# Patient Record
Sex: FEMALE | Race: WHITE | Employment: OTHER | URBAN - METROPOLITAN AREA
[De-identification: names, ages, dates, MRNs, and addresses within clinical notes are randomized per-mention and may not be internally consistent; named-entity substitution may affect disease eponyms.]

---

## 2017-04-11 RX ORDER — URSODIOL 250 MG/1
750 TABLET, FILM COATED ORAL DAILY
Qty: 270 TAB | Refills: 3 | Status: SHIPPED | OUTPATIENT
Start: 2017-04-11 | End: 2017-09-22 | Stop reason: DRUGHIGH

## 2017-09-21 ENCOUNTER — OFFICE VISIT (OUTPATIENT)
Dept: HEMATOLOGY | Age: 82
End: 2017-09-21

## 2017-09-21 ENCOUNTER — HOSPITAL ENCOUNTER (OUTPATIENT)
Dept: LAB | Age: 82
Discharge: HOME | End: 2017-09-21
Payer: MEDICARE

## 2017-09-21 VITALS
SYSTOLIC BLOOD PRESSURE: 160 MMHG | HEART RATE: 70 BPM | WEIGHT: 93.2 LBS | OXYGEN SATURATION: 100 % | HEIGHT: 63 IN | DIASTOLIC BLOOD PRESSURE: 57 MMHG | RESPIRATION RATE: 16 BRPM | TEMPERATURE: 97.7 F | BODY MASS INDEX: 16.51 KG/M2

## 2017-09-21 DIAGNOSIS — K74.3 PRIMARY BILIARY CIRRHOSIS (HCC): ICD-10-CM

## 2017-09-21 DIAGNOSIS — K74.3 PRIMARY BILIARY CIRRHOSIS (HCC): Primary | ICD-10-CM

## 2017-09-21 LAB
ALBUMIN SERPL-MCNC: 3.4 G/DL (ref 3.4–5)
ALBUMIN/GLOB SERPL: 1.1 {RATIO} (ref 0.8–1.7)
ALP SERPL-CCNC: 100 U/L (ref 45–117)
ALT SERPL-CCNC: 14 U/L (ref 13–56)
ANION GAP SERPL CALC-SCNC: 9 MMOL/L (ref 3–18)
AST SERPL-CCNC: 13 U/L (ref 15–37)
BASOPHILS # BLD: 0 K/UL (ref 0–0.06)
BASOPHILS NFR BLD: 0 % (ref 0–2)
BILIRUB DIRECT SERPL-MCNC: 0.1 MG/DL (ref 0–0.2)
BILIRUB SERPL-MCNC: 0.2 MG/DL (ref 0.2–1)
BUN SERPL-MCNC: 22 MG/DL (ref 7–18)
BUN/CREAT SERPL: 21 (ref 12–20)
CALCIUM SERPL-MCNC: 9.2 MG/DL (ref 8.5–10.1)
CHLORIDE SERPL-SCNC: 103 MMOL/L (ref 100–108)
CO2 SERPL-SCNC: 27 MMOL/L (ref 21–32)
CREAT SERPL-MCNC: 1.03 MG/DL (ref 0.6–1.3)
DIFFERENTIAL METHOD BLD: ABNORMAL
EOSINOPHIL # BLD: 0.2 K/UL (ref 0–0.4)
EOSINOPHIL NFR BLD: 2 % (ref 0–5)
ERYTHROCYTE [DISTWIDTH] IN BLOOD BY AUTOMATED COUNT: 16.2 % (ref 11.6–14.5)
GLOBULIN SER CALC-MCNC: 3.1 G/DL (ref 2–4)
GLUCOSE SERPL-MCNC: 156 MG/DL (ref 74–99)
HCT VFR BLD AUTO: 36 % (ref 35–45)
HGB BLD-MCNC: 11.3 G/DL (ref 12–16)
LYMPHOCYTES # BLD: 1.2 K/UL (ref 0.9–3.6)
LYMPHOCYTES NFR BLD: 12 % (ref 21–52)
MCH RBC QN AUTO: 29.4 PG (ref 24–34)
MCHC RBC AUTO-ENTMCNC: 31.4 G/DL (ref 31–37)
MCV RBC AUTO: 93.5 FL (ref 74–97)
MONOCYTES # BLD: 1 K/UL (ref 0.05–1.2)
MONOCYTES NFR BLD: 10 % (ref 3–10)
NEUTS SEG # BLD: 8.2 K/UL (ref 1.8–8)
NEUTS SEG NFR BLD: 76 % (ref 40–73)
PLATELET # BLD AUTO: 339 K/UL (ref 135–420)
PMV BLD AUTO: 10.6 FL (ref 9.2–11.8)
POTASSIUM SERPL-SCNC: 4.2 MMOL/L (ref 3.5–5.5)
PROT SERPL-MCNC: 6.5 G/DL (ref 6.4–8.2)
RBC # BLD AUTO: 3.85 M/UL (ref 4.2–5.3)
SODIUM SERPL-SCNC: 139 MMOL/L (ref 136–145)
WBC # BLD AUTO: 10.6 K/UL (ref 4.6–13.2)

## 2017-09-21 PROCEDURE — 80048 BASIC METABOLIC PNL TOTAL CA: CPT | Performed by: NURSE PRACTITIONER

## 2017-09-21 PROCEDURE — 80076 HEPATIC FUNCTION PANEL: CPT | Performed by: NURSE PRACTITIONER

## 2017-09-21 PROCEDURE — 85025 COMPLETE CBC W/AUTO DIFF WBC: CPT | Performed by: NURSE PRACTITIONER

## 2017-09-21 PROCEDURE — 36415 COLL VENOUS BLD VENIPUNCTURE: CPT | Performed by: NURSE PRACTITIONER

## 2017-09-21 NOTE — MR AVS SNAPSHOT
Visit Information Date & Time Provider Department Dept. Phone Encounter #  
 9/21/2017  9:30 AM SARAH Maldonado [address] of  [address] [id number] Follow-up Instructions Return in about 6 months (around 3/21/2018). Upcoming Health Maintenance Date Due DTaP/Tdap/Td series (1 - Tdap) 1/28/1955 ZOSTER VACCINE AGE 60> 11/28/1993 GLAUCOMA SCREENING Q2Y 1/28/1999 Pneumococcal 65+ Low/Medium Risk (1 of 2 - PCV13) 1/28/1999 MEDICARE YEARLY EXAM 1/28/1999 INFLUENZA AGE 9 TO ADULT 8/1/2017 Allergies as of 9/21/2017  Review Complete On: 9/21/2017 By: Kristina Hernandez Severity Noted Reaction Type Reactions Sulfa (Sulfonamide Antibiotics)  07/28/2014    Itching Current Immunizations  Never Reviewed No immunizations on file. Not reviewed this visit You Were Diagnosed With   
  
 Codes Comments Primary biliary cirrhosis (HCC)    -  Primary ICD-10-CM: C43.5 ICD-9-CM: 571.6 Vitals BP Pulse Temp Resp Height(growth percentile) 160/57 (BP 1 Location: Left arm, BP Patient Position: Sitting) 70 97.7 °F (36.5 °C) (Tympanic) 16 5' 3\" (1.6 m) Weight(growth percentile) SpO2 BMI OB Status Smoking Status 93 lb 3.2 oz (42.3 kg) 100% 16.51 kg/m2 Postmenopausal Never Smoker Vitals History BMI and BSA Data Body Mass Index Body Surface Area  
 16.51 kg/m 2 1.37 m 2 Preferred Pharmacy Pharmacy Name Phone [facility] [place] [address] [facility] [address] [phone] Your Updated Medication List  
  
   
This list is accurate as of: 9/21/17 10:14 AM.  Always use your most recent med list.  
  
  
  
  
 aspirin delayed-release 81 mg tablet Take  by mouth daily. LORazepam 1 mg tablet Commonly known as:  ATIVAN Take  by mouth every four (4) hours as needed for Anxiety. NIFEdipine ER 60 mg ER tablet Commonly known as:  PROCARDIA XL Take 60 mg by mouth daily. pilocarpine 5 mg tablet Commonly known as:  Clara Sharan Take 5 mg by mouth three (3) times daily. simvastatin 80 mg tablet Commonly known as:  ZOCOR Take 80 mg by mouth nightly. sotalol 120 mg tablet Commonly known as:  Pablo Dakota Take 120 mg by mouth two (2) times a day. synthroid 25 mcg tablet Generic drug:  levothyroxine Take  by mouth Daily (before breakfast). ursodiol 250 mg tablet Commonly known as:  ACTIGALL Take 3 Tabs by mouth daily. valsartan-hydroCHLOROthiazide 160-25 mg per tablet Commonly known as:  DIOVAN-HCT Take 1 Tab by mouth daily. Follow-up Instructions Return in about 6 months (around 3/21/2018). To-Do List   
 09/21/2017 Imaging:  US ABD LTD W ELASTOGRAPHY Introducing [facility] SERVICES! Dear Donna Ribeiro: Thank you for requesting a Sh[employer] account. Our records indicate that you already have an active Shout TV account. You can access your account anytime at [email or web address]. [employer] Did you know that you can access your hospital and ER discharge instructions at any time in Sh[employer] TV? You can also review all of your test results from your hospital stay or ER visit. Additional Information If you have questions, please visit the Frequently Asked Questions section of the Shout TV website at [email or web address] [employer]/. Remember, Shout TV is NOT to be used for urgent needs. For medical emergencies, dial 911. Now available from your iPhone and Android! Please provide this summary of care documentation to your next provider. Your primary care clinician is listed as Ruslan Puri. If you have any questions after today's visit, please call [phone].

## 2017-09-21 NOTE — PROGRESS NOTES
[address] Sharma MD, [address]       SARAH Mcdonald PA-C Mason Abrahams, [facility]   Lacinda Boast, NP Dickson Dan, NP        at [facility]     [address], [address] [facility], [address]     [phone]     FAX: [phone]    at [address], [address] [facility], [address]     [phone]     FAX: [phone]       Patient Care Team:  Anna Reyes MD as PCP - General (Family Practice)  Sherry Dawkins MD (Ophthalmology)  Yaakov Fofana MD (Endocrinology)        Problem List  Date Reviewed: 9/21/2017          Codes Class Noted    PBC (primary biliary cirrhosis) ICD-10-CM: K74.3  ICD-9-CM: 571.6  8/11/2014        Hypertension ICD-10-CM: I10  ICD-9-CM: 401.9  7/28/2014        Hypothyroidism ICD-10-CM: E03.9  ICD-9-CM: 244.9  7/28/2014        Osteoporosis ICD-10-CM: M81.0  ICD-9-CM: 733.00  7/28/2014        Atrial fibrillation ([facility] ICD-10-CM: I48.91  ICD-9-CM: 427.31  7/28/2014    Overview Signed 7/28/2014  4:04 PM by Jerald Grant MD     Medical conversion. Remains on anti-arrythmic             Vitamin D deficiency ICD-10-CM: E55.9  ICD-9-CM: 268.9  7/28/2014        Hypercholesterolemia ICD-10-CM: E78.00  ICD-9-CM: 272.0  7/28/2014        H/O partial thyroidectomy ICD-10-CM: E89.0  ICD-9-CM: V45.89  7/28/2014        Sicca syndrome ([facility] ICD-10-CM: M35.00  ICD-9-CM: 710.2  7/28/2014              Tarsha Trinidad returns to the [address] for management of Primary Biliary Cirrhosis. The active problem list, all pertinent past medical history, medications, radiologic findings and laboratory findings related to the liver disorder were reviewed with the patient. She was first noted to have an elevation in alkaline phosphate in 6/2014. Serologic evaluation was positive for AMA.       Ultrasound of the liver was performed in 7/2014. The results of the imaging demonstrated a normal appearing liver. A liver biopsy has not been performed. The patient had vascular surgery to restore circulation to her left lower extremity in June, 2017.  placed stent in her arteries. The most recent laboratory studies indicate that the liver transaminases are normal, ALP is normal, tests of hepatic synthetic and metabolic function are normal, the platelet count is normal.    Ms. Kyle Hernandez began treatment with Margarita Gordon about 3 years ago. She had a great response and her alkaline phosphatase has now normalized. She is tolerating this well without any noted side effects. The patient notes fatigue, dry eyes, dry mouth, thinning hair and itching (especailly on her scalp) that remains unchanged from her previous visit. The patient has limitations in functional activities secondary to other medical problems that are not related to the liver disease. The patient has not experienced problems concentrating, swelling of the abdomen, swelling of the lower extremities, hematemesis, hematochezia. ALLERGIES  Allergies   Allergen Reactions    Sulfa (Sulfonamide Antibiotics) Itching       MEDICATIONS  Current Outpatient Prescriptions   Medication Sig    ursodiol (ACTIGALL) 250 mg tablet Take 3 Tabs by mouth daily. (Patient taking differently: Take 500 mg by mouth daily.)    sotalol (BETAPACE) 120 mg tablet Take 120 mg by mouth two (2) times a day.  valsartan-hydrochlorothiazide (DIOVAN-HCT) 160-25 mg per tablet Take 1 Tab by mouth daily.  NIFEdipine ER (PROCARDIA XL) 60 mg ER tablet Take 60 mg by mouth daily.  levothyroxine (SYNTHROID) 25 mcg tablet Take  by mouth Daily (before breakfast).  simvastatin (ZOCOR) 80 mg tablet Take 80 mg by mouth nightly.  aspirin delayed-release 81 mg tablet Take  by mouth daily.  pilocarpine (SALAGEN) 5 mg tablet Take 5 mg by mouth three (3) times daily.     LORazepam (ATIVAN) 1 mg tablet Take  by mouth every four (4) hours as needed for Anxiety. No current facility-administered medications for this visit. SYSTEM REVIEW NOT RELATED TO LIVER DISEASE OR REVIEWED ABOVE:  Constitution systems: Negative for fever, chills, weight gain, weight loss. Eyes: Negative for visual changes. ENT: Negative for sore throat, painful swallowing. Respiratory: Negative for cough, hemoptysis, SOB. Cardiology: Negative for chest pain, palpitations. GI:  Negative for constipation or diarrhea. : Negative for urinary frequency, dysuria, hematuria, nocturia. Skin: Negative for rash. Hematology: Negative for easy bruising, blood clots. Musculo-skelatal: Negative for back pain, muscle pain, weakness. Neurologic: Negative for headaches, dizziness, vertigo, memory problems not related to HE. Psychology: Negative for anxiety, depression. FAMILY HISTORY:  The father  of CVA. The patient has no knowledge of the mother's medical condition. There is no family history of liver disease. SOCIAL HISTORY:  The patient is . The patient has 2 children, and 3 grandchildren and 6 great grand-children. The patient has never used tobacco products. The patient consumes an occasional glass of wine. The patient has never worked outside the home. PHYSICAL EXAMINATION:    Visit Vitals    /57 (BP 1 Location: Left arm, BP Patient Position: Sitting)    Pulse 70    Temp 97.7 °F (36.5 °C) (Tympanic)    Resp 16    Ht 5' 3\" (1.6 m)    Wt 93 lb 3.2 oz (42.3 kg)    SpO2 100%    BMI 16.51 kg/m2       General: No acute distress. Eyes: Sclera anicteric. ENT: No oral lesions. Thyroid normal.  Nodes: No adenopathy. Skin: No spider angiomata. No jaundice. No palmar erythema. Respiratory: Lungs clear to auscultation. Cardiovascular: Regular heart rate. No murmurs. No JVD. Abdomen: Soft non-tender. Liver size normal to percussion/palpation. Spleen not palpable.  No obvious ascites. Extremities: No edema. No muscle wasting. No gross arthritic changes. Neurologic: Alert and oriented. Cranial nerves grossly intact. No asterixsis. LABORATORY STUDIES:  [address] & Units 9/21/2017 12/28/2016   WBC 4.6 - 13.2 K/uL 10.6 9.6   ANC 1.8 - 8.0 K/UL 8.2 (H) 7.1   HGB 12.0 - 16.0 g/dL 11.3 (L) 12.6    - 420 K/uL 339 295   INR 0.8 - 1.2     AST 15 - 37 U/L 13 (L) 16   ALT 13 - 56 U/L 14 18   Alk Phos 45 - 117 U/L 100 116   Bili, Total 0.2 - 1.0 MG/DL 0.2 0.4   Bili, Direct 0.0 - 0.2 MG/DL 0.1 0.1   Albumin 3.4 - 5.0 g/dL 3.4 3.8   BUN 7.0 - 18 MG/DL 22 (H) 33 (H)   Creat 0.6 - 1.3 MG/DL 1.03 1.43 (H)   Na 136 - 145 mmol/L 139 142   K 3.5 - 5.5 mmol/L 4.2 5.0   Cl 100 - 108 mmol/L 103 105   CO2 21 - 32 mmol/L 27 26   Glucose 74 - 99 mg/dL 156 (H) 101 (H)     [facility]iver [facility] Latest Ref Rng & Units 6/28/2016   WBC 4.6 - 13.2 K/uL 7.8   ANC 1.8 - 8.0 K/UL 5.3   HGB 12.0 - 16.0 g/dL 13.4    - 420 K/uL 272   INR 0.8 - 1.2    AST 15 - 37 U/L 16   ALT 13 - 56 U/L 20   Alk Phos 45 - 117 U/L 96   Bili, Total 0.2 - 1.0 MG/DL 0.4   Bili, Direct 0.0 - 0.2 MG/DL 0.1   Albumin 3.4 - 5.0 g/dL 4.0   BUN 7.0 - 18 MG/DL 22 (H)   Creat 0.6 - 1.3 MG/DL 0.96   Na 136 - 145 mmol/L 138   K 3.5 - 5.5 mmol/L 4.1   Cl 100 - 108 mmol/L 101   CO2 21 - 32 mmol/L 30   Glucose 74 - 99 mg/dL 110 (H)     SEROLOGIES:  Serologies Latest Ref Rng 7/28/2014   Ferritin 15 - 150 ng/mL 374 (H)   Iron % Saturation 15 - 55 % 21   EDILBERTO, IFA  See patterns   EDILBERTO Pattern  1:80   C-ANCA Neg:<1:20 titer <1:20   P-ANCA Neg:<1:20 titer <1:20   ANCA Neg:<1:20 titer <1:20   ASMCA 0 - 19 Units 6   M2 Ab 0.0 - 20.0 Units 142.8 (H)     LIVER HISTOLOGY:  Not available or performed    ENDOSCOPIC PROCEDURES:  Not available or performed    RADIOLOGY:  6/2014. Ultrasound of liver. Normal appearing liver. No liver mass lesions.     OTHER TESTING:  Not available or performed    ASSESSMENT AND PLAN:  Primary biliary cirrhosis of unclear severity. The most recent laboratory studies indicate that the liver transaminases are normal, alkaline phosphatase is normal, tests of hepatic synthetic and metabolic function are normal, and the platelet count is normal.      She is being treated with ELIANE 600 mg QD. She is tolerating this well and her enzymes and AlkPhos have all normalized. The patient was directed to continue all current medications at the current dosages. There are no contraindications for the patient to take any medications that are necessary for treatment of other medical issues. This includes statins to treat hypercholesterolemia. The patient was counseled regarding alcohol consumption. Sicca syndrome is an extrahepatic manifestation of PBC and will not resolve. Dr. Ang Yang can continue to treat the dry eyes this symptomatically. She should keep up her fluid intake to deal with the dry mouth and see the dentist regularly to reduce risk of cavities which is increased by Sicca. Discussed extrahepatic manifestations of PBC such as osteoporosis, elevated cholesterol and slight increased risk of breast cancer. The risk of osteoporosis is increased in patients with PBC. DEXA bone density to assess for osteoporosis should be ordered by the patients primary care physician. The patient was advised to take calcium supplementation and Vitamin D. She states that she has DEXA scans every 2 years. She was advised to have her mammogram completed. She was advised to take supplemental vitamin A, D, E, and K. Patients with PBC are at increased risk for hypercholesterolemia. However, this is not associated with an increased risk of cardiac disease and MI because this is typically an elevation in HDL cholesterol. There is no contraindication for treatment with a statin if this is felt to be indicated based upon cardiac risk assessment.     All of the above issues were discussed with the patient. All questions were answered. The patient expressed a clear understanding of the above. 30 minutes total time spent with this patient with more than 50% of this time spent counseling and coordinating care as described above. [address] [address] in 6 months.       Hair Vides NP  L[facility] [place] of [address] [place] [address], [address] [place] [address] , [address] [place] [address] - [address]  [phone]

## 2017-09-22 RX ORDER — URSODIOL 300 MG/1
CAPSULE ORAL
Refills: 2 | COMMUNITY
Start: 2017-07-16 | End: 2019-09-25 | Stop reason: SDUPTHER

## 2017-12-30 ENCOUNTER — HOSPITAL ENCOUNTER (EMERGENCY)
Age: 82
Discharge: HOME | End: 2017-12-30
Attending: EMERGENCY MEDICINE | Admitting: EMERGENCY MEDICINE
Payer: MEDICARE

## 2017-12-30 VITALS
DIASTOLIC BLOOD PRESSURE: 80 MMHG | SYSTOLIC BLOOD PRESSURE: 172 MMHG | RESPIRATION RATE: 18 BRPM | TEMPERATURE: 98 F | HEART RATE: 71 BPM | BODY MASS INDEX: 19.14 KG/M2 | OXYGEN SATURATION: 100 % | HEIGHT: 62 IN | WEIGHT: 104 LBS

## 2017-12-30 DIAGNOSIS — I10 UNCONTROLLED HYPERTENSION: ICD-10-CM

## 2017-12-30 DIAGNOSIS — R04.0 EPISTAXIS: Primary | ICD-10-CM

## 2017-12-30 LAB
ANION GAP SERPL CALC-SCNC: 10 MMOL/L (ref 3–18)
BASOPHILS # BLD: 0 K/UL (ref 0–0.06)
BASOPHILS NFR BLD: 0 % (ref 0–2)
BUN SERPL-MCNC: 28 MG/DL (ref 7–18)
BUN/CREAT SERPL: 25 (ref 12–20)
CALCIUM SERPL-MCNC: 9.8 MG/DL (ref 8.5–10.1)
CHLORIDE SERPL-SCNC: 101 MMOL/L (ref 100–108)
CO2 SERPL-SCNC: 25 MMOL/L (ref 21–32)
CREAT SERPL-MCNC: 1.1 MG/DL (ref 0.6–1.3)
DIFFERENTIAL METHOD BLD: ABNORMAL
EOSINOPHIL # BLD: 0.2 K/UL (ref 0–0.4)
EOSINOPHIL NFR BLD: 2 % (ref 0–5)
ERYTHROCYTE [DISTWIDTH] IN BLOOD BY AUTOMATED COUNT: 14.1 % (ref 11.6–14.5)
GLUCOSE SERPL-MCNC: 133 MG/DL (ref 74–99)
HCT VFR BLD AUTO: 38.4 % (ref 35–45)
HGB BLD-MCNC: 12.5 G/DL (ref 12–16)
INR PPP: 1 (ref 0.8–1.2)
LYMPHOCYTES # BLD: 0.9 K/UL (ref 0.9–3.6)
LYMPHOCYTES NFR BLD: 9 % (ref 21–52)
MCH RBC QN AUTO: 28.9 PG (ref 24–34)
MCHC RBC AUTO-ENTMCNC: 32.6 G/DL (ref 31–37)
MCV RBC AUTO: 88.9 FL (ref 74–97)
MONOCYTES # BLD: 0.6 K/UL (ref 0.05–1.2)
MONOCYTES NFR BLD: 6 % (ref 3–10)
NEUTS SEG # BLD: 8.8 K/UL (ref 1.8–8)
NEUTS SEG NFR BLD: 83 % (ref 40–73)
PLATELET # BLD AUTO: 317 K/UL (ref 135–420)
PMV BLD AUTO: 10.3 FL (ref 9.2–11.8)
POTASSIUM SERPL-SCNC: 4.2 MMOL/L (ref 3.5–5.5)
PROTHROMBIN TIME: 12.5 SEC (ref 11.5–15.2)
RBC # BLD AUTO: 4.32 M/UL (ref 4.2–5.3)
SODIUM SERPL-SCNC: 136 MMOL/L (ref 136–145)
WBC # BLD AUTO: 10.5 K/UL (ref 4.6–13.2)

## 2017-12-30 PROCEDURE — 99283 EMERGENCY DEPT VISIT LOW MDM: CPT

## 2017-12-30 PROCEDURE — 80048 BASIC METABOLIC PNL TOTAL CA: CPT | Performed by: EMERGENCY MEDICINE

## 2017-12-30 PROCEDURE — 75810000284 HC CNTRL NASAL HEMORHRAGE SIMPLE

## 2017-12-30 PROCEDURE — 85025 COMPLETE CBC W/AUTO DIFF WBC: CPT | Performed by: EMERGENCY MEDICINE

## 2017-12-30 PROCEDURE — 74011250637 HC RX REV CODE- 250/637: Performed by: EMERGENCY MEDICINE

## 2017-12-30 PROCEDURE — 85610 PROTHROMBIN TIME: CPT | Performed by: EMERGENCY MEDICINE

## 2017-12-30 PROCEDURE — 77030011649 HC PK NSL RHNO S&N -B

## 2017-12-30 RX ORDER — RANITIDINE 150 MG/1
150 TABLET, FILM COATED ORAL 2 TIMES DAILY
COMMUNITY

## 2017-12-30 RX ORDER — AMOXICILLIN AND CLAVULANATE POTASSIUM 500; 125 MG/1; MG/1
1 TABLET, FILM COATED ORAL 3 TIMES DAILY
Qty: 15 TAB | Refills: 0 | Status: SHIPPED | OUTPATIENT
Start: 2017-12-30 | End: 2018-01-04

## 2017-12-30 RX ORDER — AMOXICILLIN AND CLAVULANATE POTASSIUM 500; 125 MG/1; MG/1
1 TABLET, FILM COATED ORAL
Status: COMPLETED | OUTPATIENT
Start: 2017-12-30 | End: 2017-12-30

## 2017-12-30 RX ORDER — CHOLECALCIFEROL (VITAMIN D3) 125 MCG
CAPSULE ORAL
COMMUNITY

## 2017-12-30 RX ORDER — HYDROCODONE BITARTRATE AND ACETAMINOPHEN 5; 325 MG/1; MG/1
1 TABLET ORAL
Qty: 20 TAB | Refills: 0 | Status: SHIPPED | OUTPATIENT
Start: 2017-12-30 | End: 2018-03-21 | Stop reason: ALTCHOICE

## 2017-12-30 RX ORDER — SOTALOL HYDROCHLORIDE 120 MG/1
120 TABLET ORAL 2 TIMES DAILY
COMMUNITY

## 2017-12-30 RX ADMIN — AMOXICILLIN AND CLAVULANATE POTASSIUM 1 TABLET: 500; 125 TABLET, FILM COATED ORAL at 03:43

## 2017-12-30 NOTE — DISCHARGE INSTRUCTIONS
High Blood Pressure: Care Instructions  Your Care Instructions    If your blood pressure is usually above 140/90, you have high blood pressure, or hypertension. That means the top number is 140 or higher or the bottom number is 90 or higher, or both. Despite what a lot of people think, high blood pressure usually doesn't cause headaches or make you feel dizzy or lightheaded. It usually has no symptoms. But it does increase your risk for heart attack, stroke, and kidney or eye damage. The higher your blood pressure, the more your risk increases. Your doctor will give you a goal for your blood pressure. Your goal will be based on your health and your age. An example of a goal is to keep your blood pressure below 140/90. Lifestyle changes, such as eating healthy and being active, are always important to help lower blood pressure. You might also take medicine to reach your blood pressure goal.  Follow-up care is a key part of your treatment and safety. Be sure to make and go to all appointments, and call your doctor if you are having problems. It's also a good idea to know your test results and keep a list of the medicines you take. How can you care for yourself at home? Medical treatment  · If you stop taking your medicine, your blood pressure will go back up. You may take one or more types of medicine to lower your blood pressure. Be safe with medicines. Take your medicine exactly as prescribed. Call your doctor if you think you are having a problem with your medicine. · Talk to your doctor before you start taking aspirin every day. Aspirin can help certain people lower their risk of a heart attack or stroke. But taking aspirin isn't right for everyone, because it can cause serious bleeding. · See your doctor regularly. You may need to see the doctor more often at first or until your blood pressure comes down.   · If you are taking blood pressure medicine, talk to your doctor before you take decongestants or anti-inflammatory medicine, such as ibuprofen. Some of these medicines can raise blood pressure. · Learn how to check your blood pressure at home. Lifestyle changes  · Stay at a healthy weight. This is especially important if you put on weight around the waist. Losing even 10 pounds can help you lower your blood pressure. · If your doctor recommends it, get more exercise. Walking is a good choice. Bit by bit, increase the amount you walk every day. Try for at least 30 minutes on most days of the week. You also may want to swim, bike, or do other activities. · Avoid or limit alcohol. Talk to your doctor about whether you can drink any alcohol. · Try to limit how much sodium you eat to less than 2,300 milligrams (mg) a day. Your doctor may ask you to try to eat less than 1,500 mg a day. · Eat plenty of fruits (such as bananas and oranges), vegetables, legumes, whole grains, and low-fat dairy products. · Lower the amount of saturated fat in your diet. Saturated fat is found in animal products such as milk, cheese, and meat. Limiting these foods may help you lose weight and also lower your risk for heart disease. · Do not smoke. Smoking increases your risk for heart attack and stroke. If you need help quitting, talk to your doctor about stop-smoking programs and medicines. These can increase your chances of quitting for good. When should you call for help? Call 911 anytime you think you may need emergency care. This may mean having symptoms that suggest that your blood pressure is causing a serious heart or blood vessel problem. Your blood pressure may be over 180/110. ? For example, call 911 if:  ? · You have symptoms of a heart attack. These may include:  ¨ Chest pain or pressure, or a strange feeling in the chest.  ¨ Sweating. ¨ Shortness of breath. ¨ Nausea or vomiting.   ¨ Pain, pressure, or a strange feeling in the back, neck, jaw, or upper belly or in one or both shoulders or arms.  ¨ Lightheadedness or sudden weakness. ¨ A fast or irregular heartbeat. ? · You have symptoms of a stroke. These may include:  ¨ Sudden numbness, tingling, weakness, or loss of movement in your face, arm, or leg, especially on only one side of your body. ¨ Sudden vision changes. ¨ Sudden trouble speaking. ¨ Sudden confusion or trouble understanding simple statements. ¨ Sudden problems with walking or balance. ¨ A sudden, severe headache that is different from past headaches. ? · You have severe back or belly pain. ?Do not wait until your blood pressure comes down on its own. Get help right away. ?Call your doctor now or seek immediate care if:  ? · Your blood pressure is much higher than normal (such as 180/110 or higher), but you don't have symptoms. ? · You think high blood pressure is causing symptoms, such as:  ¨ Severe headache. ¨ Blurry vision. ? Watch closely for changes in your health, and be sure to contact your doctor if:  ? · Your blood pressure measures 140/90 or higher at least 2 times. That means the top number is 140 or higher or the bottom number is 90 or higher, or both. ? · You think you may be having side effects from your blood pressure medicine. ? · Your blood pressure is usually normal, but it goes above normal at least 2 times. Where can you learn more? Go to http://radha-vinayak.info/. Enter F362 in the search box to learn more about \"High Blood Pressure: Care Instructions. \"  Current as of: September 21, 2016  Content Version: 11.4  © 8141-4036 [employer]. Care instructions adapted under license by [employer] (which disclaims liability or warranty for this information). If you have questions about a medical condition or this instruction, always ask your healthcare professional. [place] [address] any warranty or liability for your use of this information.        Nosebleeds: Care Instructions  Your Care Instructions    Nosebleeds are common, especially if you have colds or allergies. Many things can cause a nosebleed. Some nosebleeds stop on their own with pressure. Others need packing. Some get cauterized (sealed). If you have gauze or other packing materials in your nose, you will need to follow up with your doctor to have the packing removed. You may need more treatment if you get nosebleeds a lot. The doctor has checked you carefully, but problems can develop later. If you notice any problems or new symptoms, get medical treatment right away. Follow-up care is a key part of your treatment and safety. Be sure to make and go to all appointments, and call your doctor if you are having problems. It's also a good idea to know your test results and keep a list of the medicines you take. How can you care for yourself at home? · If you get another nosebleed:  ¨ Sit up and tilt your head slightly forward. This keeps blood from going down your throat. ¨ Use your thumb and index finger to pinch your nose shut for 10 minutes. Use a clock. Do not check to see if the bleeding has stopped before the 10 minutes are up. If the bleeding has not stopped, pinch your nose shut for another 10 minutes. ¨ When the bleeding has stopped, try not to pick, rub, or blow your nose for 12 hours. Avoiding these things helps keep your nose from bleeding again. · If your doctor prescribed antibiotics, take them as directed. Do not stop taking them just because you feel better. You need to take the full course of antibiotics. To prevent nosebleeds  · Do not blow your nose too hard. · Try not to lift or strain after a nosebleed. · Raise your head on a pillow while you sleep. · Put a thin layer of a saline- or water-based nasal gel, such as NasoGel, inside your nose. Put it on the septum, which divides your nostrils. This will prevent dryness that can cause nosebleeds. · Use a vaporizer or humidifier to add moisture to your bedroom. Follow the directions for cleaning the machine. · Do not use aspirin, ibuprofen (Advil, Motrin), or naproxen (Aleve) for 36 to 48 hours after a nosebleed unless your doctor tells you to. You can use acetaminophen (Tylenol) for pain relief. · Talk to your doctor about stopping any other medicines you are taking. Some medicines may make you more likely to get a nosebleed. · Do not use cold medicines or nasal sprays without first talking to your doctor. They can make your nose dry. When should you call for help? Call 911 anytime you think you may need emergency care. For example, call if:  ? · You passed out (lost consciousness). ?Call your doctor now or seek immediate medical care if:  ? · You get another nosebleed and your nose is still bleeding after you have applied pressure 3 times for 10 minutes each time (30 minutes total). ? · There is a lot of blood running down the back of your throat even after you pinch your nose and tilt your head forward. ? · You have a fever. ? · You have sinus pain. ? Watch closely for changes in your health, and be sure to contact your doctor if:  ? · You get nosebleeds often, even if they stop. ? · You do not get better as expected. Where can you learn more? Go to http://radha-vinayak.info/. Enter S156 in the search box to learn more about \"Nosebleeds: Care Instructions. \"  Current as of: March 20, 2017  Content Version: 11.4  © 2335-8046 [employer]. Care instructions adapted under license by [employer] (which disclaims liability or warranty for this information). If you have questions about a medical condition or this instruction, always ask your healthcare professional. [address] any warranty or liability for your use of this information.

## 2017-12-30 NOTE — ED TRIAGE NOTES
Nosebleed since 10 pm. Pt reports that BP is currently up from anxiety. Pt does take plavix. Sepsis Screening completed    (  )Patient meets SIRS criteria. ( x )Patient does not meet SIRS criteria.       SIRS Criteria is achieved when two or more of the following are present   Temperature < 96.8°F (36°C) or > 100.9°F (38.3°C)   Heart Rate > 90 beats per minute   Respiratory Rate > 20 breaths per minute   WBC count > 12,000 or <4,000 or > 10% bands

## 2017-12-30 NOTE — ED PROVIDER NOTES
EMERGENCY DEPARTMENT HISTORY AND PHYSICAL EXAM    Date: 12/30/2017  Patient Name: Melody Kern    History of Presenting Illness     Chief Complaint   Patient presents with    Epistaxis         History Provided By: Patient    Chief Complaint: Bilateral epistaxis  Duration: 3.5 hours   Timing:  Constant  Location: Bilateral nares  Associated Symptoms: denies any other associated signs or symptoms    Additional History (Context):   1:44 AM  Melody Kern is a 80 y.o. female with PSHX cardiac stent placement who presents ambulatory to the emergency department C/O bilateral epistaxis that began on the left, onset 3.5 hours ago. Notes no relief with pressure. Notes no other associated sxs. Pt is on Plavix and HTN medications, states she took her HTN medication today. Pt denies fever, chills, or any other sxs or complaints. PCP: Renan Quintero MD    Current Outpatient Prescriptions   Medication Sig Dispense Refill    sotalol (BETAPACE) 120 mg tablet Take 120 mg by mouth two (2) times a day.  cholecalciferol, vitamin D3, (VITAMIN D3) 2,000 unit tab Take  by mouth.  raNITIdine (ZANTAC) 150 mg tablet Take 150 mg by mouth two (2) times a day.  amoxicillin-clavulanate (AUGMENTIN) 500-125 mg per tablet Take 1 Tab by mouth three (3) times daily for 5 days. 15 Tab 0    HYDROcodone-acetaminophen (NORCO) 5-325 mg per tablet Take 1 Tab by mouth every six (6) hours as needed for Pain. Max Daily Amount: 4 Tabs. 20 Tab 0    ursodiol (ACTIGALL) 300 mg capsule TAKE 2 CAPSULES BY MOUTH EVERY DAY  2    valsartan-hydrochlorothiazide (DIOVAN-HCT) 160-25 mg per tablet Take 1 Tab by mouth daily.  NIFEdipine ER (PROCARDIA XL) 60 mg ER tablet Take 60 mg by mouth daily.  levothyroxine (SYNTHROID) 25 mcg tablet Take  by mouth Daily (before breakfast).  simvastatin (ZOCOR) 80 mg tablet Take 80 mg by mouth nightly.       LORazepam (ATIVAN) 1 mg tablet Take  by mouth every four (4) hours as needed for Anxiety.  aspirin delayed-release 81 mg tablet Take  by mouth daily. Past History     Past Medical History:  Past Medical History:   Diagnosis Date    Status post aortic coarctation stent placement        Past Surgical History:  Past Surgical History:   Procedure Laterality Date    HX ANGIOPLASTY         Family History:  History reviewed. No pertinent family history. Social History:  Social History   Substance Use Topics    Smoking status: Never Smoker    Smokeless tobacco: Never Used    Alcohol use 0.0 oz/week     0 Standard drinks or equivalent per week      Comment: 1 glass of wine some of the time       Allergies: Allergies   Allergen Reactions    Sulfa (Sulfonamide Antibiotics) Itching         Review of Systems   Review of Systems   Constitutional: Negative for activity change, appetite change, fever and unexpected weight change. HENT: Positive for nosebleeds (bilateral). Negative for congestion and sore throat. Eyes: Negative for pain and redness. Respiratory: Negative for cough and shortness of breath. Cardiovascular: Negative for chest pain and palpitations. Gastrointestinal: Negative for abdominal pain, diarrhea, nausea and vomiting. Endocrine: Negative for polydipsia and polyuria. Genitourinary: Negative for difficulty urinating and dysuria. Musculoskeletal: Negative for back pain and neck pain. Skin: Negative for pallor and rash. Neurological: Negative for headaches. All other systems reviewed and are negative. Physical Exam     Vitals:    12/30/17 0125 12/30/17 0309   BP: (!) 218/75 172/80   Pulse: 78 71   Resp: 16 18   Temp: 97.9 °F (36.6 °C) 98 °F (36.7 °C)   SpO2: 100% 100%   Weight: 47.2 kg (104 lb)    Height: 5' 2\" (1.575 m)      Physical Exam   Constitutional: She is oriented to person, place, and time. She appears well-developed and well-nourished. HENT:   Head: Normocephalic and atraumatic. Right Ear: External ear normal. No hemotympanum. Left Ear: External ear normal. No hemotympanum. Nose: Epistaxis (bilateral with large clot in left nostril) is observed. Mouth/Throat: Oropharynx is clear and moist.   Post oropharyngeal bleeding noted. Eyes: Conjunctivae and EOM are normal. Pupils are equal, round, and reactive to light. Neck: Normal range of motion. Neck supple. No JVD present. No tracheal deviation present. Cardiovascular: Normal rate, regular rhythm, normal heart sounds and intact distal pulses. Exam reveals no gallop and no friction rub. No murmur heard. Pulmonary/Chest: Effort normal and breath sounds normal. No respiratory distress. She has no wheezes. She has no rales. Abdominal: Soft. Bowel sounds are normal. She exhibits no distension and no mass. There is no tenderness. There is no rebound and no guarding. Musculoskeletal: Normal range of motion. She exhibits no edema or tenderness. Neurological: She is alert and oriented to person, place, and time. She has normal reflexes. No cranial nerve deficit. Skin: Skin is warm and dry. No rash noted. Psychiatric: She has a normal mood and affect. Her behavior is normal.   Nursing note and vitals reviewed. Diagnostic Study Results     Labs -     Recent Results (from the past 12 hour(s))   CBC WITH AUTOMATED DIFF    Collection Time: 12/30/17  2:25 AM   Result Value Ref Range    WBC 10.5 4.6 - 13.2 K/uL    RBC 4.32 4.20 - 5.30 M/uL    HGB 12.5 12.0 - 16.0 g/dL    HCT 38.4 35.0 - 45.0 %    MCV 88.9 74.0 - 97.0 FL    MCH 28.9 24.0 - 34.0 PG    MCHC 32.6 31.0 - 37.0 g/dL    RDW 14.1 11.6 - 14.5 %    PLATELET 058 149 - 924 K/uL    MPV 10.3 9.2 - 11.8 FL    NEUTROPHILS 83 (H) 40 - 73 %    LYMPHOCYTES 9 (L) 21 - 52 %    MONOCYTES 6 3 - 10 %    EOSINOPHILS 2 0 - 5 %    BASOPHILS 0 0 - 2 %    ABS. NEUTROPHILS 8.8 (H) 1.8 - 8.0 K/UL    ABS. LYMPHOCYTES 0.9 0.9 - 3.6 K/UL    ABS. MONOCYTES 0.6 0.05 - 1.2 K/UL    ABS. EOSINOPHILS 0.2 0.0 - 0.4 K/UL    ABS.  BASOPHILS 0.0 0.0 - 0.06 K/UL    DF AUTOMATED     METABOLIC PANEL, BASIC    Collection Time: 12/30/17  2:25 AM   Result Value Ref Range    Sodium 136 136 - 145 mmol/L    Potassium 4.2 3.5 - 5.5 mmol/L    Chloride 101 100 - 108 mmol/L    CO2 25 21 - 32 mmol/L    Anion gap 10 3.0 - 18 mmol/L    Glucose 133 (H) 74 - 99 mg/dL    BUN 28 (H) 7.0 - 18 MG/DL    Creatinine 1.10 0.6 - 1.3 MG/DL    BUN/Creatinine ratio 25 (H) 12 - 20      GFR est AA 57 (L) >60 ml/min/1.73m2    GFR est non-AA 47 (L) >60 ml/min/1.73m2    Calcium 9.8 8.5 - 10.1 MG/DL   PROTHROMBIN TIME + INR    Collection Time: 12/30/17  2:25 AM   Result Value Ref Range    Prothrombin time 12.5 11.5 - 15.2 sec    INR 1.0 0.8 - 1.2         Radiologic Studies -    No orders to display     CT Results  (Last 48 hours)    None        CXR Results  (Last 48 hours)    None            Medical Decision Making   I am the first provider for this patient. I reviewed the vital signs, available nursing notes, past medical history, past surgical history, family history and social history. Vital Signs-Reviewed the patient's vital signs. Pulse Oximetry Analysis - 100% on RA     Records Reviewed: Nursing Notes    Provider Notes (Medical Decision Making):   MDM: Trauma, coagulopathy, dried nasal mucosa    Procedures:  Epistaxis Management  Date/Time: 12/30/2017 2:13 AM  Performed by: Jodi Fregoso  Authorized by: Jodi Fregoso     Consent:     Consent obtained:  Verbal    Consent given by:  Patient    Risks discussed:  Bleeding and pain    Alternatives discussed:  No treatment  Anesthesia (see MAR for exact dosages): Anesthesia method:  None  Procedure details:     Treatment site:  L anterior and R anterior    Treatment method:  Nasal tampon and anterior pack (5.0 Rapid Rhino Rocket)    Treatment complexity:  Limited    Treatment episode: initial    Post-procedure details:     Assessment:  Bleeding stopped    Patient tolerance of procedure:   Tolerated well, no immediate complications          ED Course:   1:44 AM   Initial assessment performed. The patients presenting problems have been discussed, and they are in agreement with the care plan formulated and outlined with them. I have encouraged them to ask questions as they arise throughout their visit. 3:40 AM  Pt is feeling better. Bleeding ceased. Posterior pharynx clear without bleeding. Diagnosis and Disposition       DISCHARGE NOTE:  3:42 AM  Elis Castillo  results have been reviewed with her. She has been counseled regarding her diagnosis, treatment, and plan. She verbally conveys understanding and agreement of the signs, symptoms, diagnosis, treatment and prognosis and additionally agrees to follow up as discussed. She also agrees with the care-plan and conveys that all of her questions have been answered. I have also provided discharge instructions for her that include: educational information regarding their diagnosis and treatment, and list of reasons why they would want to return to the ED prior to their follow-up appointment, should her condition change. She has been provided with education for proper emergency department utilization. Discussion:  80 y.o. female presents bilateral epistaxis, left greater than right. Pt was hypertensive in ED which improved spotaneously. Tolerated packing of both nostrils. Labs unremarkable. Pt given Augmentin and will f/u with ENT for further evaluation. CLINICAL IMPRESSION:    1. Epistaxis    2. Uncontrolled hypertension        PLAN:  1. D/C Home  2. Discharge Medication List as of 12/30/2017  3:53 AM      START taking these medications    Details   amoxicillin-clavulanate (AUGMENTIN) 500-125 mg per tablet Take 1 Tab by mouth three (3) times daily for 5 days. , Print, Disp-15 Tab, R-0      HYDROcodone-acetaminophen (NORCO) 5-325 mg per tablet Take 1 Tab by mouth every six (6) hours as needed for Pain.  Max Daily Amount: 4 Tabs., Print, Disp-20 Tab, R-0 CONTINUE these medications which have NOT CHANGED    Details   sotalol (BETAPACE) 120 mg tablet Take 120 mg by mouth two (2) times a day., Historical Med      cholecalciferol, vitamin D3, (VITAMIN D3) 2,000 unit tab Take  by mouth., Historical Med      raNITIdine (ZANTAC) 150 mg tablet Take 150 mg by mouth two (2) times a day., Historical Med      ursodiol (ACTIGALL) 300 mg capsule TAKE 2 CAPSULES BY MOUTH EVERY DAY, Historical Med, R-2      valsartan-hydrochlorothiazide (DIOVAN-HCT) 160-25 mg per tablet Take 1 Tab by mouth daily. , Historical Med      NIFEdipine ER (PROCARDIA XL) 60 mg ER tablet Take 60 mg by mouth daily. , Historical Med      levothyroxine (SYNTHROID) 25 mcg tablet Take  by mouth Daily (before breakfast). , Historical Med      simvastatin (ZOCOR) 80 mg tablet Take 80 mg by mouth nightly., Historical Med      LORazepam (ATIVAN) 1 mg tablet Take  by mouth every four (4) hours as needed for Anxiety. , Historical Med      aspirin delayed-release 81 mg tablet Take  by mouth daily. , Historical Med           3. Follow-up Information     Follow up With Details Comments [address] Almas Hammer MD Schedule an appointment as soon as possible for a visit in 2 days for ENT follow up [address]  [phone]        As needed, If symptoms worsen     [facility] EMERGENCY DEPT  As needed, If symptoms worsen [address] [place] [id number]  [phone]        _______________________________    Attestations: This note is prepared by Jose Martell, acting as Scribe for Oren Medrano MD.    Oren Medrano MD:  The scribe's documentation has been prepared under my direction and personally reviewed by me in its entirety.   I confirm that the note above accurately reflects all work, treatment, procedures, and medical decision making performed by me.  _______________________________

## 2018-03-21 ENCOUNTER — HOSPITAL ENCOUNTER (OUTPATIENT)
Dept: LAB | Age: 83
Discharge: HOME | End: 2018-03-21
Payer: MEDICARE

## 2018-03-21 ENCOUNTER — OFFICE VISIT (OUTPATIENT)
Dept: HEMATOLOGY | Age: 83
End: 2018-03-21

## 2018-03-21 VITALS
RESPIRATION RATE: 16 BRPM | BODY MASS INDEX: 18.92 KG/M2 | TEMPERATURE: 97.6 F | WEIGHT: 102.8 LBS | HEIGHT: 62 IN | DIASTOLIC BLOOD PRESSURE: 50 MMHG | OXYGEN SATURATION: 98 % | SYSTOLIC BLOOD PRESSURE: 165 MMHG | HEART RATE: 61 BPM

## 2018-03-21 DIAGNOSIS — K74.3 PRIMARY BILIARY CHOLANGITIS (HCC): ICD-10-CM

## 2018-03-21 DIAGNOSIS — K74.3 PRIMARY BILIARY CHOLANGITIS (HCC): Primary | ICD-10-CM

## 2018-03-21 LAB
ALBUMIN SERPL-MCNC: 3.9 G/DL (ref 3.4–5)
ALBUMIN/GLOB SERPL: 1.2 {RATIO} (ref 0.8–1.7)
ALP SERPL-CCNC: 105 U/L (ref 45–117)
ALT SERPL-CCNC: 18 U/L (ref 13–56)
ANION GAP SERPL CALC-SCNC: 12 MMOL/L (ref 3–18)
AST SERPL-CCNC: 17 U/L (ref 15–37)
BASOPHILS # BLD: 0 K/UL (ref 0–0.06)
BASOPHILS NFR BLD: 0 % (ref 0–2)
BILIRUB DIRECT SERPL-MCNC: 0.2 MG/DL (ref 0–0.2)
BILIRUB SERPL-MCNC: 0.6 MG/DL (ref 0.2–1)
BUN SERPL-MCNC: 24 MG/DL (ref 7–18)
BUN/CREAT SERPL: 24 (ref 12–20)
CALCIUM SERPL-MCNC: 9.5 MG/DL (ref 8.5–10.1)
CHLORIDE SERPL-SCNC: 101 MMOL/L (ref 100–108)
CO2 SERPL-SCNC: 26 MMOL/L (ref 21–32)
CREAT SERPL-MCNC: 0.98 MG/DL (ref 0.6–1.3)
DIFFERENTIAL METHOD BLD: ABNORMAL
EOSINOPHIL # BLD: 0.4 K/UL (ref 0–0.4)
EOSINOPHIL NFR BLD: 4 % (ref 0–5)
ERYTHROCYTE [DISTWIDTH] IN BLOOD BY AUTOMATED COUNT: 14.9 % (ref 11.6–14.5)
GLOBULIN SER CALC-MCNC: 3.2 G/DL (ref 2–4)
GLUCOSE SERPL-MCNC: 139 MG/DL (ref 74–99)
HCT VFR BLD AUTO: 38.4 % (ref 35–45)
HGB BLD-MCNC: 12.2 G/DL (ref 12–16)
LYMPHOCYTES # BLD: 0.9 K/UL (ref 0.9–3.6)
LYMPHOCYTES NFR BLD: 10 % (ref 21–52)
MCH RBC QN AUTO: 28.8 PG (ref 24–34)
MCHC RBC AUTO-ENTMCNC: 31.8 G/DL (ref 31–37)
MCV RBC AUTO: 90.8 FL (ref 74–97)
MONOCYTES # BLD: 0.9 K/UL (ref 0.05–1.2)
MONOCYTES NFR BLD: 9 % (ref 3–10)
NEUTS SEG # BLD: 6.9 K/UL (ref 1.8–8)
NEUTS SEG NFR BLD: 77 % (ref 40–73)
PLATELET # BLD AUTO: 263 K/UL (ref 135–420)
PMV BLD AUTO: 10.4 FL (ref 9.2–11.8)
POTASSIUM SERPL-SCNC: 4.8 MMOL/L (ref 3.5–5.5)
PROT SERPL-MCNC: 7.1 G/DL (ref 6.4–8.2)
RBC # BLD AUTO: 4.23 M/UL (ref 4.2–5.3)
SODIUM SERPL-SCNC: 139 MMOL/L (ref 136–145)
WBC # BLD AUTO: 9.1 K/UL (ref 4.6–13.2)

## 2018-03-21 PROCEDURE — 36415 COLL VENOUS BLD VENIPUNCTURE: CPT | Performed by: NURSE PRACTITIONER

## 2018-03-21 PROCEDURE — 85025 COMPLETE CBC W/AUTO DIFF WBC: CPT | Performed by: NURSE PRACTITIONER

## 2018-03-21 PROCEDURE — 80048 BASIC METABOLIC PNL TOTAL CA: CPT | Performed by: NURSE PRACTITIONER

## 2018-03-21 PROCEDURE — 80076 HEPATIC FUNCTION PANEL: CPT | Performed by: NURSE PRACTITIONER

## 2018-03-21 RX ORDER — CLOPIDOGREL BISULFATE 75 MG/1
TABLET ORAL
Refills: 5 | COMMUNITY
Start: 2018-02-27 | End: 2018-09-25 | Stop reason: ALTCHOICE

## 2018-03-21 NOTE — PROGRESS NOTES
[address] Sharma MD, [place], [place], [place]       SARAH Sousa PA-C Aloysius Frisk, [facility]   SARAH Fraire NP        at [address], [address] [facility], [address]     [phone]     FAX: [phone]    at [address], [address]     [phone]     FAX: [phone]       Patient Care Team:  Lizzie Wilson MD as PCP - General (Family Practice)  Vianey Hernandez MD (Ophthalmology)  Zaheer Whittington MD (Vascular Surgery)        Problem List  Date Reviewed: 3/21/2018          Codes Class Noted    PBC (primary biliary cirrhosis) ICD-10-CM: K74.3  ICD-9-CM: 571.6  8/11/2014        Hypertension ICD-10-CM: I10  ICD-9-CM: 401.9  7/28/2014        Hypothyroidism ICD-10-CM: E03.9  ICD-9-CM: 244.9  7/28/2014        Osteoporosis ICD-10-CM: M81.0  ICD-9-CM: 733.00  7/28/2014        [facility] fibrillation ([facility] ICD-10-CM: I48.91  ICD-9-CM: 427.31  7/28/2014    Overview Signed 7/28/2014  4:04 PM by Jose Uribe MD     Medical conversion. Remains on anti-arrythmic             Vitamin D deficiency ICD-10-CM: E55.9  ICD-9-CM: 268.9  7/28/2014        Hypercholesterolemia ICD-10-CM: E78.00  ICD-9-CM: 272.0  7/28/2014        H/O partial thyroidectomy ICD-10-CM: E89.0  ICD-9-CM: V45.89  7/28/2014        Sicca syndrome ([facility] ICD-10-CM: M35.00  ICD-9-CM: 710.2  7/28/2014              Tamara Lombardi returns to the [address] for management of Primary Biliary Cirrhosis. The active problem list, all pertinent past medical history, medications, radiologic findings and laboratory findings related to the liver disorder were reviewed with the patient. She was first noted to have an elevation in alkaline phosphate in 6/2014. Serologic evaluation was positive for AMA.       Ultrasound of the liver was performed in 7/2014. The results of the imaging demonstrated a normal appearing liver. A liver biopsy has not been performed. The patient had vascular surgery to restore circulation to her left lower extremity in June, 2017.  placed stent in her arteries. The most recent laboratory studies indicate that the liver transaminases are normal, ALP is normal, tests of hepatic synthetic and metabolic function are normal, the platelet count is normal.    Ms. Lennox Cooper began treatment with Claudine Burger in August, 2014. She had a great response and her alkaline phosphatase has now normalized. She is tolerating this well without any noted side effects. The patient has no complaints which can be attributed to liver disease. The patient completes all daily activities without any functional limitations. The patient has not experienced fatigue, fevers, chills, shortness of breath, chest pain, pain in the right side over the liver, diffuse abdominal pain, nausea, vomiting, constipation, diarrhrea, dry eyes, dry mouth, arthralgias, myalgias, yellowing of the eyes or skin, itching, dark urine, problems concentrating, swelling of the abdomen, swelling of the lower extremities, hematemesis, or hematochezia. ALLERGIES  Allergies   Allergen Reactions    Sulfa (Sulfonamide Antibiotics) Itching       MEDICATIONS  Current Outpatient Prescriptions   Medication Sig    clopidogrel (PLAVIX) 75 mg tab TAKE 1 TAB BY MOUTH ONCE A DAY.  sotalol (BETAPACE) 120 mg tablet Take 120 mg by mouth two (2) times a day.  cholecalciferol, vitamin D3, (VITAMIN D3) 2,000 unit tab Take  by mouth.  raNITIdine (ZANTAC) 150 mg tablet Take 150 mg by mouth two (2) times a day.  ursodiol (ACTIGALL) 300 mg capsule TAKE 2 CAPSULES BY MOUTH EVERY DAY    valsartan-hydrochlorothiazide (DIOVAN-HCT) 160-25 mg per tablet Take 1 Tab by mouth daily.  NIFEdipine ER (PROCARDIA XL) 60 mg ER tablet Take 60 mg by mouth daily.     levothyroxine (SYNTHROID) 25 mcg tablet Take  by mouth Daily (before breakfast).  simvastatin (ZOCOR) 80 mg tablet Take 80 mg by mouth nightly.  LORazepam (ATIVAN) 1 mg tablet Take  by mouth every four (4) hours as needed for Anxiety. No current facility-administered medications for this visit. SYSTEM REVIEW NOT RELATED TO LIVER DISEASE OR REVIEWED ABOVE:  Constitution systems: Negative for fever, chills, weight gain, weight loss. Eyes: Negative for visual changes. ENT: Negative for sore throat, painful swallowing. Respiratory: Negative for cough, hemoptysis, SOB. Cardiology: Negative for chest pain, palpitations. GI:  Negative for constipation or diarrhea. : Negative for urinary frequency, dysuria, hematuria, nocturia. Skin: Negative for rash. Hematology: Negative for easy bruising, blood clots. Musculo-skelatal: Negative for back pain, muscle pain, weakness. Neurologic: Negative for headaches, dizziness, vertigo, memory problems not related to HE. Psychology: Negative for anxiety, depression. FAMILY HISTORY:  The father  of CVA. The patient has no knowledge of the mother's medical condition. There is no family history of liver disease. SOCIAL HISTORY:  The patient is . The patient has 2 children, and 3 grandchildren and 6 great grand-children. The patient has never used tobacco products. The patient consumes an occasional glass of wine. The patient has never worked outside the home. PHYSICAL EXAMINATION:    Visit Vitals    /50 (BP 1 Location: Left arm, BP Patient Position: Sitting)    Pulse 61    Temp 97.6 °F (36.4 °C) (Tympanic)    Resp 16    Ht 5' 2\" (1.575 m)    Wt 102 lb 12.8 oz (46.6 kg)    SpO2 98%    BMI 18.8 kg/m2       General: No acute distress. Eyes: Sclera anicteric. ENT: No oral lesions. Thyroid normal.  Nodes: No adenopathy. Skin: No spider angiomata. No jaundice. No palmar erythema.   Respiratory: Lungs clear to auscultation. Cardiovascular: Regular heart rate. No murmurs. No JVD. Abdomen: Soft non-tender. Liver size normal to percussion/palpation. Spleen not palpable. No obvious ascites. Extremities: No edema. No muscle wasting. No gross arthritic changes. Neurologic: Alert and oriented. Cranial nerves grossly intact. No asterixsis. LABORATORY STUDIES:  [facility] [address] [place] [address] 3/21/2018 12/30/2017   WBC 4.6 - 13.2 K/uL 9.1 10.5   ANC 1.8 - 8.0 K/UL 6.9 8.8 (H)   HGB 12.0 - 16.0 g/dL 12.2 12.5    - 420 K/uL 263 317   INR 0.8 - 1.2    1.0   AST 15 - 37 U/L 17    ALT 13 - 56 U/L 18    Alk Phos 45 - 117 U/L 105    Bili, Total 0.2 - 1.0 MG/DL 0.6    Bili, Direct 0.0 - 0.2 MG/DL 0.2    Albumin 3.4 - 5.0 g/dL 3.9    BUN 7.0 - 18 MG/DL 24 (H) 28 (H)   Creat 0.6 - 1.3 MG/DL 0.98 1.10   Na 136 - 145 mmol/L 139 136   K 3.5 - 5.5 mmol/L 4.8 4.2   Cl 100 - 108 mmol/L 101 101   CO2 21 - 32 mmol/L 26 25   Glucose 74 - 99 mg/dL 139 (H) 133 (H)       SEROLOGIES:  Serologies Latest Ref [facility] 7/28/2014   Ferritin 15 - 150 ng/mL 374 (H)   Iron % Saturation 15 - 55 % 21   EDILBERTO, IFA  See patterns   EDILBERTO Pattern  1:80   C-ANCA Neg:<1:20 titer <1:20   P-ANCA Neg:<1:20 titer <1:20   ANCA Neg:<1:20 titer <1:20   ASMCA 0 - 19 Units 6   M2 Ab 0.0 - 20.0 Units 142.8 (H)     LIVER HISTOLOGY:  Not available or performed    ENDOSCOPIC PROCEDURES:  Not available or performed    RADIOLOGY:  6/2014. Ultrasound of liver. Normal appearing liver. No liver mass lesions. OTHER TESTING:  Not available or performed    ASSESSMENT AND PLAN:  Primary biliary cirrhosis of unclear severity. The most recent laboratory studies indicate that the liver transaminases are normal, alkaline phosphatase is normal, tests of hepatic synthetic and metabolic function are normal, and the platelet count is normal.      She is being treated with ELIANE 600 mg QD.   She is tolerating this well and her enzymes and AlkPhos have all normalized. The patient was directed to continue all current medications at the current dosages. There are no contraindications for the patient to take any medications that are necessary for treatment of other medical issues. This includes statins to treat hypercholesterolemia. The patient was counseled regarding alcohol consumption. Sicca syndrome is an extrahepatic manifestation of PBC and will not resolve. Dr. Debbi Mosher can continue to treat the dry eyes symptomatically. She should keep up her fluid intake to deal with the dry mouth and see the dentist regularly to reduce risk of cavities which is increased by Sicca. Discussed extrahepatic manifestations of PBC such as osteoporosis, elevated cholesterol and slight increased risk of breast cancer. The risk of osteoporosis is increased in patients with PBC. DEXA bone density to assess for osteoporosis should be ordered by the patients primary care physician. The patient was advised to take calcium supplementation and Vitamin D. She states that she has DEXA scans every 2 years. She was advised to have her mammogram completed. She was advised to take supplemental vitamin A, D, E, and K. Patients with PBC are at increased risk for hypercholesterolemia. However, this is not associated with an increased risk of cardiac disease and MI because this is typically an elevation in HDL cholesterol. There is no contraindication for treatment with a statin if this is felt to be indicated based upon cardiac risk assessment. All of the above issues were discussed with the patient. All questions were answered. The patient expressed a clear understanding of the above. [facility] 87 in 6 months.       Mali Ornelas NP  [facility] [place] [facility] [address] [place] [address] [place], [address] [place] [address], [address]  [phone]

## 2018-03-21 NOTE — PROGRESS NOTES
1. Have you been to the ER, urgent care clinic since your last visit? Hospitalized since your last visit? No    2. Have you seen or consulted any other health care providers outside of the [address] [place] [address] since your last visit? Include any pap smears or colon screening.  No

## 2018-03-21 NOTE — MR AVS SNAPSHOT
[address] [address] 
[phone] Patient: Brittany Howard MRN: [id number] :1934 Visit Information Date & Time Provider Department Dept. Phone Encounter #  
 3/21/2018  9:30 AM [address] [id number] Follow-up Instructions Return in about 6 months (around 2018). Your Appointments 2018 11:00 AM  
Follow Up [place] [address] [facility] ([address] [place] [address]) Appt Note: 6mnth f/up [facility], [facility] [address] [facility]  
  
   
 [facility], [address] [place] [address] Upcoming Health Maintenance Date Due DTaP/Tdap/Td series (1 - Tdap) 1955 ZOSTER VACCINE AGE 60> 1993 GLAUCOMA SCREENING Q2Y 1999 Bone Densitometry (Dexa) Screening 1999 Pneumococcal 65+ Low/Medium Risk (1 of 2 - PCV13) 1999 Influenza Age 5 to Adult 2017 MEDICARE YEARLY EXAM 3/14/2018 Allergies as of 3/21/2018  Review Complete On: 3/21/2018 By: Yashira Grullon Severity Noted Reaction Type Reactions Sulfa (Sulfonamide Antibiotics)  2014    Itching Current Immunizations  Never Reviewed No immunizations on file. Not reviewed this visit You Were Diagnosed With   
  
 Codes Comments Primary biliary cholangitis (HCC)    -  Primary ICD-10-CM: P64.1 ICD-9-CM: 571.6 Vitals BP Pulse Temp Resp Height(growth percentile) 165/50 (BP 1 Location: Left arm, BP Patient Position: Sitting) 61 97.6 °F (36.4 °C) (Tympanic) 16 5' 2\" (1.575 m) Weight(growth percentile) SpO2 BMI OB Status Smoking Status 102 lb 12.8 oz (46.6 kg) 98% 18.8 kg/m2 Postmenopausal Never Smoker Vitals History BMI and BSA Data  Body Mass Index Body Surface Area  
 18.8 kg/m 2 1.43 m 2  
  
 Preferred Pharmacy Pharmacy Name Phone [facility] Polina T[address] AT [facility] [address] [phone] Your Updated Medication List  
  
   
This list is accurate as of 3/21/18 10:29 AM.  Always use your most recent med list.  
  
  
  
  
 clopidogrel 75 mg Tab Commonly known as:  PLAVIX TAKE 1 TAB BY MOUTH ONCE A DAY. LORazepam 1 mg tablet Commonly known as:  ATIVAN Take  by mouth every four (4) hours as needed for Anxiety. NIFEdipine ER 60 mg ER tablet Commonly known as:  PROCARDIA XL Take 60 mg by mouth daily. simvastatin 80 mg tablet Commonly known as:  ZOCOR Take 80 mg by mouth nightly. sotalol 120 mg tablet Commonly known as:  Juanis Doug Take 120 mg by mouth two (2) times a day. synthroid 25 mcg tablet Generic drug:  levothyroxine Take  by mouth Daily (before breakfast). ursodiol 300 mg capsule Commonly known as:  ACTIGALL TAKE 2 CAPSULES BY MOUTH EVERY DAY  
  
 valsartan-hydroCHLOROthiazide 160-25 mg per tablet Commonly known as:  DIOVAN-HCT Take 1 Tab by mouth daily. VITAMIN D3 2,000 unit Tab Generic drug:  cholecalciferol (vitamin D3) Take  by mouth. ZANTAC 150 mg tablet Generic drug:  raNITIdine Take 150 mg by mouth two (2) times a day. Follow-up Instructions Return in about 6 months (around 9/21/2018). To-Do List   
 03/21/2018 Lab:  CBC WITH AUTOMATED DIFF   
  
 03/21/2018 Lab:  HEPATIC FUNCTION PANEL   
  
 03/21/2018 Lab:  METABOLIC PANEL, BASIC   
  
 03/21/2018 10:30 AM  
  Appointment with THE [facility] LAB OUTREACH INSURANCE at [address] [place] [address] ([phone]) Introducing [facility] & HEALTH SERVICES! Dear Lashaun Smoker: Thank you for requesting a [employer] account. Our records indicate that you already have an active [employer] account. You can access your account anytime at [email or web address] Did you know that you can access your hospital and ER discharge instructions at any time in [employer]? You can also review all of your test results from your hospital stay or ER visit. Additional Information If you have questions, please visit the Frequently Asked Questions section of the [employer] website at [email or web address] Remember, [employer] is NOT to be used for urgent needs. For medical emergencies, dial 911. Now available from your iPhone and Android! Please provide this summary of care documentation to your next provider. Your primary care clinician is listed as Allison Willoughby. If you have any questions after today's visit, please call [phone].

## 2018-04-03 NOTE — PROGRESS NOTES
Called patient to inform patient of Ezequiel Subramanian's findings. Patient confirmed understanding.

## 2018-09-25 ENCOUNTER — OFFICE VISIT (OUTPATIENT)
Dept: HEMATOLOGY | Age: 83
End: 2018-09-25

## 2018-09-25 ENCOUNTER — HOSPITAL ENCOUNTER (OUTPATIENT)
Dept: LAB | Age: 83
Discharge: HOME | End: 2018-09-25
Payer: MEDICARE

## 2018-09-25 VITALS
HEART RATE: 63 BPM | TEMPERATURE: 97.2 F | RESPIRATION RATE: 18 BRPM | WEIGHT: 97 LBS | BODY MASS INDEX: 17.85 KG/M2 | DIASTOLIC BLOOD PRESSURE: 54 MMHG | OXYGEN SATURATION: 100 % | HEIGHT: 62 IN | SYSTOLIC BLOOD PRESSURE: 150 MMHG

## 2018-09-25 DIAGNOSIS — K74.3 PRIMARY BILIARY CHOLANGITIS (HCC): ICD-10-CM

## 2018-09-25 DIAGNOSIS — K74.3 PRIMARY BILIARY CHOLANGITIS (HCC): Primary | ICD-10-CM

## 2018-09-25 LAB
ALBUMIN SERPL-MCNC: 3.8 G/DL (ref 3.4–5)
ALBUMIN/GLOB SERPL: 1.3 {RATIO} (ref 0.8–1.7)
ALP SERPL-CCNC: 88 U/L (ref 45–117)
ALT SERPL-CCNC: 18 U/L (ref 13–56)
ANION GAP SERPL CALC-SCNC: 9 MMOL/L (ref 3–18)
AST SERPL-CCNC: 16 U/L (ref 15–37)
BASOPHILS # BLD: 0 K/UL (ref 0–0.1)
BASOPHILS NFR BLD: 0 % (ref 0–2)
BILIRUB DIRECT SERPL-MCNC: 0.2 MG/DL (ref 0–0.2)
BILIRUB SERPL-MCNC: 0.5 MG/DL (ref 0.2–1)
BUN SERPL-MCNC: 25 MG/DL (ref 7–18)
BUN/CREAT SERPL: 25 (ref 12–20)
CALCIUM SERPL-MCNC: 9.3 MG/DL (ref 8.5–10.1)
CHLORIDE SERPL-SCNC: 102 MMOL/L (ref 100–108)
CO2 SERPL-SCNC: 27 MMOL/L (ref 21–32)
CREAT SERPL-MCNC: 1.01 MG/DL (ref 0.6–1.3)
DIFFERENTIAL METHOD BLD: ABNORMAL
EOSINOPHIL # BLD: 0.3 K/UL (ref 0–0.4)
EOSINOPHIL NFR BLD: 3 % (ref 0–5)
ERYTHROCYTE [DISTWIDTH] IN BLOOD BY AUTOMATED COUNT: 14.5 % (ref 11.6–14.5)
GLOBULIN SER CALC-MCNC: 3 G/DL (ref 2–4)
GLUCOSE SERPL-MCNC: 166 MG/DL (ref 74–99)
HCT VFR BLD AUTO: 37.9 % (ref 35–45)
HGB BLD-MCNC: 12.3 G/DL (ref 12–16)
LYMPHOCYTES # BLD: 1.3 K/UL (ref 0.9–3.6)
LYMPHOCYTES NFR BLD: 12 % (ref 21–52)
MCH RBC QN AUTO: 30.6 PG (ref 24–34)
MCHC RBC AUTO-ENTMCNC: 32.5 G/DL (ref 31–37)
MCV RBC AUTO: 94.3 FL (ref 74–97)
MONOCYTES # BLD: 1 K/UL (ref 0.05–1.2)
MONOCYTES NFR BLD: 9 % (ref 3–10)
NEUTS SEG # BLD: 8.1 K/UL (ref 1.8–8)
NEUTS SEG NFR BLD: 76 % (ref 40–73)
PLATELET # BLD AUTO: 277 K/UL (ref 135–420)
PMV BLD AUTO: 11 FL (ref 9.2–11.8)
POTASSIUM SERPL-SCNC: 4.6 MMOL/L (ref 3.5–5.5)
PROT SERPL-MCNC: 6.8 G/DL (ref 6.4–8.2)
RBC # BLD AUTO: 4.02 M/UL (ref 4.2–5.3)
SODIUM SERPL-SCNC: 138 MMOL/L (ref 136–145)
WBC # BLD AUTO: 10.6 K/UL (ref 4.6–13.2)

## 2018-09-25 PROCEDURE — 36415 COLL VENOUS BLD VENIPUNCTURE: CPT | Performed by: NURSE PRACTITIONER

## 2018-09-25 PROCEDURE — 80048 BASIC METABOLIC PNL TOTAL CA: CPT | Performed by: NURSE PRACTITIONER

## 2018-09-25 PROCEDURE — 85025 COMPLETE CBC W/AUTO DIFF WBC: CPT | Performed by: NURSE PRACTITIONER

## 2018-09-25 PROCEDURE — 80076 HEPATIC FUNCTION PANEL: CPT | Performed by: NURSE PRACTITIONER

## 2018-09-25 RX ORDER — ASPIRIN 81 MG/1
TABLET ORAL DAILY
COMMUNITY

## 2018-09-25 NOTE — MR AVS SNAPSHOT
[address] [address] 
[phone] Patient: Matthew Tate MRN: [id number] :1934 Visit Information Date & Time Provider Department Dept. Phone Encounter #  
 2018 11:00 AM SARAH [address] [id number] Follow-up Instructions Return in about 6 months (around 3/25/2019). Upcoming Health Maintenance Date Due DTaP/Tdap/Td series (1 - Tdap) 1955 Shingrix Vaccine Age 50> (1 of 2) 1984 GLAUCOMA SCREENING Q2Y 1999 Bone Densitometry (Dexa) Screening 1999 Pneumococcal 65+ Low/Medium Risk (1 of 2 - PCV13) 1999 MEDICARE YEARLY EXAM 3/14/2018 Influenza Age 5 to Adult 2018 Allergies as of 2018  Review Complete On: 2018 By: Jeff Guevara Severity Noted Reaction Type Reactions Sulfa (Sulfonamide Antibiotics)  2014    Itching Current Immunizations  Never Reviewed No immunizations on file. Not reviewed this visit You Were Diagnosed With   
  
 Codes Comments Primary biliary cholangitis (HCC)    -  Primary ICD-10-CM: U46.2 ICD-9-CM: 571.6 Vitals BP Pulse Temp Resp Height(growth percentile) 150/54 (BP 1 Location: Left arm, BP Patient Position: Sitting) 63 97.2 °F (36.2 °C) (Tympanic) 18 5' 2\" (1.575 m) Weight(growth percentile) SpO2 BMI OB Status Smoking Status 97 lb (44 kg) 100% 17.74 kg/m2 Postmenopausal Never Smoker Vitals History BMI and BSA Data Body Mass Index Body Surface Area 17.74 kg/m 2 1.39 m 2 Preferred Pharmacy Pharmacy Name Phone [facility]ORE Polina Dashawn [phone] AT [facility] [address] [place] [phone] Your Updated Medication List  
  
   
This list is accurate as of 18 11:33 AM.  Always use your most recent med list.  
  
  
  
  
 aspirin delayed-release 81 mg tablet Take  by mouth daily. LORazepam 1 mg tablet Commonly known as:  ATIVAN Take  by mouth every four (4) hours as needed for Anxiety. NIFEdipine ER 60 mg ER tablet Commonly known as:  PROCARDIA XL Take 60 mg by mouth daily. simvastatin 80 mg tablet Commonly known as:  ZOCOR Take 80 mg by mouth nightly. sotalol 120 mg tablet Commonly known as:  Juanis Guild Take 120 mg by mouth two (2) times a day. synthroid 25 mcg tablet Generic drug:  levothyroxine Take  by mouth Daily (before breakfast). ursodiol 300 mg capsule Commonly known as:  ACTIGALL TAKE 2 CAPSULES BY MOUTH EVERY DAY  
  
 valsartan-hydroCHLOROthiazide 160-25 mg per tablet Commonly known as:  DIOVAN-HCT Take 1 Tab by mouth daily. VITAMIN D3 2,000 unit Tab Generic drug:  cholecalciferol (vitamin D3) Take  by mouth. ZANTAC 150 mg tablet Generic drug:  raNITIdine Take 150 mg by mouth two (2) times a day. Follow-up Instructions Return in about 6 months (around 3/25/2019). To-Do List   
 09/25/2018 Lab:  CBC WITH AUTOMATED DIFF   
  
 09/25/2018 Lab:  HEPATIC FUNCTION PANEL   
  
 09/25/2018 Lab:  METABOLIC PANEL, BASIC Introducing [facility] & HEALTH SERVICES! Dear Kristen Campos: Thank you for requesting a [employer] account. Our records indicate that you already have an active [employer] account. You can access your account anytime at [email or web address] Did you know that you can access your hospital and ER discharge instructions at any time in H[employer]uan? You can also review all of your test results from your hospital stay or ER visit. Additional Information If you have questions, please visit the Frequently Asked Questions section of the [employer] website at [email or web address] Remember, [employer] is NOT to be used for urgent needs. For medical emergencies, dial 911. Now available from your iPhone and Android! Please provide this summary of care documentation to your next provider. Your primary care clinician is listed as [facility]. If you have any questions after today's visit, please call [phone].

## 2018-09-25 NOTE — PROGRESS NOTES
1. Have you been to the ER, urgent care clinic since your last visit? Hospitalized since your last visit? No    2. Have you seen or consulted any other health care providers outside of the [address] [place] [address] since your last visit? Include any pap smears or colon screening.  No

## 2018-09-25 NOTE — PROGRESS NOTES
[address] MD Zachary, Mary Naranjo, [place] Muriel Ryan, [place]       Lisy Arriaga, MONIQUE May, [facility]   SARAH Ware NP        at [address]     [address] [place] [address], [address] [address]     [phone]     FAX: [phone]    at [facility], [address] [place] [address], [address] [facility] [address]     [phone]     FAX: [phone]       Patient Care Team:  Shannan Carlos MD as PCP - General (Family Practice)  Minnie Koyanagi, MD (Ophthalmology)  Suze Gonzalez MD (Vascular Surgery)        Problem List  Date Reviewed: 9/25/2018          Codes Class Noted    PBC (primary biliary cirrhosis) ICD-10-CM: K74.3  ICD-9-CM: 571.6  8/11/2014        Hypertension ICD-10-CM: I10  ICD-9-CM: 401.9  7/28/2014        Hypothyroidism ICD-10-CM: E03.9  ICD-9-CM: 244.9  7/28/2014        Osteoporosis ICD-10-CM: M81.0  ICD-9-CM: 733.00  7/28/2014        Atrial fibrillation ([facility] [address] ICD-10-CM: I48.91  ICD-9-CM: 427.31  7/28/2014    Overview Signed 7/28/2014  4:04 PM by Daniel Power MD     Medical conversion. Remains on anti-arrythmic             Vitamin D deficiency ICD-10-CM: E55.9  ICD-9-CM: 268.9  7/28/2014        Hypercholesterolemia ICD-10-CM: E78.00  ICD-9-CM: 272.0  7/28/2014        H/O partial thyroidectomy ICD-10-CM: E89.0  ICD-9-CM: 246.8  7/28/2014        Sicca syndrome ([facility] [address] ICD-10-CM: M35.00  ICD-9-CM: 710.2  7/28/2014              Marilu Sepulveda returns to the [facility] for management of Primary Biliary Cirrhosis. The active problem list, all pertinent past medical history, medications, radiologic findings and laboratory findings related to the liver disorder were reviewed with the patient. She was first noted to have an elevation in alkaline phosphate in 6/2014. Serologic evaluation was positive for AMA.       Ultrasound of the liver was performed in 7/2014. The results of the imaging demonstrated a normal appearing liver. A liver biopsy has not been performed. The patient had vascular surgery to restore circulation to her left lower extremity in June, 2017.  placed stent in her arteries. The most recent laboratory studies indicate that the liver transaminases are normal, alkaline phosphatase is normal, tests of hepatic synthetic and metabolic function are normal, and the platelet count is normal.      Ms. Sinda Hammans began treatment with [facility] [place] in August, 2014. She had a great response and her alkaline phosphatase has now normalized. She is tolerating this well without any noted side effects. The patient has no complaints which can be attributed to liver disease. The patient completes all daily activities without any functional limitations. The patient has not experienced fatigue, fevers, chills, shortness of breath, chest pain, pain in the right side over the liver, diffuse abdominal pain, nausea, vomiting, constipation, diarrhrea, dry eyes, dry mouth, arthralgias, myalgias, yellowing of the eyes or skin, itching, dark urine, problems concentrating, swelling of the abdomen, swelling of the lower extremities, hematemesis, or hematochezia. ALLERGIES  Allergies   Allergen Reactions    Sulfa (Sulfonamide Antibiotics) Itching       MEDICATIONS  Current Outpatient Prescriptions   Medication Sig    aspirin delayed-release 81 mg tablet Take  by mouth daily.  sotalol (BETAPACE) 120 mg tablet Take 120 mg by mouth two (2) times a day.  cholecalciferol, vitamin D3, (VITAMIN D3) 2,000 unit tab Take  by mouth.  raNITIdine (ZANTAC) 150 mg tablet Take 150 mg by mouth two (2) times a day.  ursodiol (ACTIGALL) 300 mg capsule TAKE 2 CAPSULES BY MOUTH EVERY DAY    NIFEdipine ER (PROCARDIA XL) 60 mg ER tablet Take 60 mg by mouth daily.  levothyroxine (SYNTHROID) 25 mcg tablet Take  by mouth Daily (before breakfast).     simvastatin (ZOCOR) 80 mg tablet Take 80 mg by mouth nightly.  LORazepam (ATIVAN) 1 mg tablet Take  by mouth every four (4) hours as needed for Anxiety.  valsartan-hydrochlorothiazide (DIOVAN-HCT) 160-25 mg per tablet Take 1 Tab by mouth daily. No current facility-administered medications for this visit. SYSTEM REVIEW NOT RELATED TO LIVER DISEASE OR REVIEWED ABOVE:  Constitution systems: Negative for fever, chills, weight gain, weight loss. Eyes: Negative for visual changes. ENT: Negative for sore throat, painful swallowing. Respiratory: Negative for cough, hemoptysis, SOB. Cardiology: Negative for chest pain, palpitations. GI:  Negative for constipation or diarrhea. : Negative for urinary frequency, dysuria, hematuria, nocturia. Skin: Negative for rash. Hematology: Negative for easy bruising, blood clots. Musculo-skelatal: Negative for back pain, muscle pain, weakness. Neurologic: Negative for headaches, dizziness, vertigo, memory problems not related to HE. Psychology: Negative for anxiety, depression. FAMILY HISTORY:  The father  of CVA. The patient has no knowledge of the mother's medical condition. There is no family history of liver disease. SOCIAL HISTORY:  The patient is . The patient has 2 children, and 3 grandchildren and 6 great grand-children. The patient has never used tobacco products. The patient consumes an occasional glass of wine. The patient has never worked outside the home. PHYSICAL EXAMINATION:    Visit Vitals    /54 (BP 1 Location: Left arm, BP Patient Position: Sitting)    Pulse 63    Temp 97.2 °F (36.2 °C) (Tympanic)    Resp 18    Ht 5' 2\" (1.575 m)    Wt 97 lb (44 kg)    SpO2 100%    BMI 17.74 kg/m2       General: No acute distress. Eyes: Sclera anicteric. ENT: No oral lesions. Thyroid normal.  Nodes: No adenopathy. Skin: No spider angiomata. No jaundice. No palmar erythema.   Respiratory: Lungs clear to auscultation. Cardiovascular: Regular heart rate. No murmurs. No JVD. Abdomen: Soft non-tender. Liver size normal to percussion/palpation. Spleen not palpable. No obvious ascites. Extremities: No edema. No muscle wasting. No gross arthritic changes. Neurologic: Alert and oriented. Cranial nerves grossly intact. No asterixsis. LABORATORY STUDIES:  Liver [place] of [address] Units 9/25/2018 3/21/2018   WBC 4.6 - 13.2 K/uL 10.6 9.1   ANC 1.8 - 8.0 K/UL 8.1 (H) 6.9   HGB 12.0 - 16.0 g/dL 12.3 12.2    - 420 K/uL 277 263   INR 0.8 - 1.2       AST 15 - 37 U/L 16 17   ALT 13 - 56 U/L 18 18   Alk Phos 45 - 117 U/L 88 105   Bili, Total 0.2 - 1.0 MG/DL 0.5 0.6   Bili, Direct 0.0 - 0.2 MG/DL 0.2 0.2   Albumin 3.4 - 5.0 g/dL 3.8 3.9   BUN 7.0 - 18 MG/DL 25 (H) 24 (H)   Creat 0.6 - 1.3 MG/DL 1.01 0.98   Na 136 - 145 mmol/L 138 139   K 3.5 - 5.5 mmol/L 4.6 4.8   Cl 100 - 108 mmol/L 102 101   CO2 21 - 32 mmol/L 27 26   Glucose 74 - 99 mg/dL 166 (H) 139 (H)     SEROLOGIES:  Serologies Latest Ref [facility] 7/28/2014   Ferritin 15 - 150 ng/mL 374 (H)   Iron % Saturation 15 - 55 % 21   EDILBERTO, IFA  See patterns   EDILBERTO Pattern  1:80   C-ANCA Neg:<1:20 titer <1:20   P-ANCA Neg:<1:20 titer <1:20   ANCA Neg:<1:20 titer <1:20   ASMCA 0 - 19 Units 6   M2 Ab 0.0 - 20.0 Units 142.8 (H)     LIVER HISTOLOGY:  Not available or performed    ENDOSCOPIC PROCEDURES:  Not available or performed    RADIOLOGY:  6/2014. Ultrasound of liver. Normal appearing liver. No liver mass lesions. OTHER TESTING:  Not available or performed    ASSESSMENT AND PLAN:  Primary biliary cirrhosis of unclear severity. The most recent laboratory studies indicate that the liver transaminases are normal, alkaline phosphatase is normal, tests of hepatic synthetic and metabolic function are normal, and the platelet count is normal.      She is being treated with ELIANE 600 mg QD.   She is tolerating this well and her enzymes and AlkPhos have all normalized. The patient was directed to continue all current medications at the current dosages. There are no contraindications for the patient to take any medications that are necessary for treatment of other medical issues. This includes statins to treat hypercholesterolemia. The need to perform an assessment of liver fibrosis was discussed with the patient. Elastography  can assess liver fibrosis and determine if a patient has advanced fibrosis or cirrhosis without the need for liver biopsy. This can also be performed with ultrasound. This was ordered and should be performed before her next appointment. The patient was counseled regarding alcohol consumption. Sicca syndrome is an extrahepatic manifestation of PBC and will not resolve. Dr. Claudene Lapidus can continue to treat the dry eyes symptomatically. She should keep up her fluid intake to deal with the dry mouth and see the dentist regularly to reduce risk of cavities which is increased by Sicca. Discussed extrahepatic manifestations of PBC such as osteoporosis, elevated cholesterol and slight increased risk of breast cancer. The risk of osteoporosis is increased in patients with PBC. DEXA bone density to assess for osteoporosis should be ordered by the patients primary care physician. The patient was advised to take calcium supplementation and Vitamin D. She states that she has DEXA scans every 2 years. She was advised to have her mammogram completed. She was advised to take supplemental vitamin A, D, E, and K. Patients with PBC are at increased risk for hypercholesterolemia. However, this is not associated with an increased risk of cardiac disease and MI because this is typically an elevation in HDL cholesterol. There is no contraindication for treatment with a statin if this is felt to be indicated based upon cardiac risk assessment. All of the above issues were discussed with the patient.   All questions were answered. The patient expressed a clear understanding of the above. [facility] 87 in 6 months.       Shari Durham NP  Liver [place] of [address], [address] [place] [address] Michaelle Hughes, [address]  [phone]

## 2018-10-03 ENCOUNTER — TELEPHONE (OUTPATIENT)
Dept: HEMATOLOGY | Age: 83
End: 2018-10-03

## 2018-10-04 ENCOUNTER — TELEPHONE (OUTPATIENT)
Dept: HEMATOLOGY | Age: 83
End: 2018-10-04

## 2018-10-04 NOTE — TELEPHONE ENCOUNTER
----- Message from Zbigniew Cuevas 2901 sent at 9/27/2018  9:59 AM EDT -----  Regarding: SARAH Nieves/ telephone  The pt is requesting a callback with her blood work results.       Best contact number is [phone]

## 2019-03-28 ENCOUNTER — OFFICE VISIT (OUTPATIENT)
Dept: HEMATOLOGY | Age: 84
End: 2019-03-28

## 2019-03-28 ENCOUNTER — HOSPITAL ENCOUNTER (OUTPATIENT)
Dept: LAB | Age: 84
Discharge: HOME | End: 2019-03-28
Payer: MEDICARE

## 2019-03-28 VITALS
HEART RATE: 61 BPM | BODY MASS INDEX: 18.51 KG/M2 | SYSTOLIC BLOOD PRESSURE: 159 MMHG | TEMPERATURE: 97 F | HEIGHT: 62 IN | WEIGHT: 100.6 LBS | OXYGEN SATURATION: 90 % | DIASTOLIC BLOOD PRESSURE: 56 MMHG

## 2019-03-28 DIAGNOSIS — K74.3 PRIMARY BILIARY CHOLANGITIS (HCC): Primary | ICD-10-CM

## 2019-03-28 DIAGNOSIS — K74.3 PRIMARY BILIARY CHOLANGITIS (HCC): ICD-10-CM

## 2019-03-28 LAB
ALBUMIN SERPL-MCNC: 3.9 G/DL (ref 3.4–5)
ALBUMIN/GLOB SERPL: 1.2 {RATIO} (ref 0.8–1.7)
ALP SERPL-CCNC: 97 U/L (ref 45–117)
ALT SERPL-CCNC: 15 U/L (ref 13–56)
ANION GAP SERPL CALC-SCNC: 4 MMOL/L (ref 3–18)
AST SERPL-CCNC: 17 U/L (ref 15–37)
BASOPHILS # BLD: 0 K/UL (ref 0–0.1)
BASOPHILS NFR BLD: 1 % (ref 0–2)
BILIRUB DIRECT SERPL-MCNC: 0.2 MG/DL (ref 0–0.2)
BILIRUB SERPL-MCNC: 0.4 MG/DL (ref 0.2–1)
BUN SERPL-MCNC: 29 MG/DL (ref 7–18)
BUN/CREAT SERPL: 25 (ref 12–20)
CALCIUM SERPL-MCNC: 9.3 MG/DL (ref 8.5–10.1)
CHLORIDE SERPL-SCNC: 104 MMOL/L (ref 100–108)
CO2 SERPL-SCNC: 30 MMOL/L (ref 21–32)
CREAT SERPL-MCNC: 1.14 MG/DL (ref 0.6–1.3)
DIFFERENTIAL METHOD BLD: ABNORMAL
EOSINOPHIL # BLD: 0.4 K/UL (ref 0–0.4)
EOSINOPHIL NFR BLD: 5 % (ref 0–5)
ERYTHROCYTE [DISTWIDTH] IN BLOOD BY AUTOMATED COUNT: 13.9 % (ref 11.6–14.5)
GLOBULIN SER CALC-MCNC: 3.3 G/DL (ref 2–4)
GLUCOSE SERPL-MCNC: 180 MG/DL (ref 74–99)
HCT VFR BLD AUTO: 44 % (ref 35–45)
HGB BLD-MCNC: 13.6 G/DL (ref 12–16)
LYMPHOCYTES # BLD: 1.2 K/UL (ref 0.9–3.6)
LYMPHOCYTES NFR BLD: 15 % (ref 21–52)
MCH RBC QN AUTO: 29.6 PG (ref 24–34)
MCHC RBC AUTO-ENTMCNC: 30.9 G/DL (ref 31–37)
MCV RBC AUTO: 95.7 FL (ref 74–97)
MONOCYTES # BLD: 0.7 K/UL (ref 0.05–1.2)
MONOCYTES NFR BLD: 9 % (ref 3–10)
NEUTS SEG # BLD: 5.9 K/UL (ref 1.8–8)
NEUTS SEG NFR BLD: 70 % (ref 40–73)
PLATELET # BLD AUTO: 313 K/UL (ref 135–420)
PMV BLD AUTO: 10.8 FL (ref 9.2–11.8)
POTASSIUM SERPL-SCNC: 4.2 MMOL/L (ref 3.5–5.5)
PROT SERPL-MCNC: 7.2 G/DL (ref 6.4–8.2)
RBC # BLD AUTO: 4.6 M/UL (ref 4.2–5.3)
SODIUM SERPL-SCNC: 138 MMOL/L (ref 136–145)
WBC # BLD AUTO: 8.2 K/UL (ref 4.6–13.2)

## 2019-03-28 PROCEDURE — 36415 COLL VENOUS BLD VENIPUNCTURE: CPT

## 2019-03-28 PROCEDURE — 85025 COMPLETE CBC W/AUTO DIFF WBC: CPT

## 2019-03-28 PROCEDURE — 80076 HEPATIC FUNCTION PANEL: CPT

## 2019-03-28 PROCEDURE — 80048 BASIC METABOLIC PNL TOTAL CA: CPT

## 2019-03-28 NOTE — PROGRESS NOTES
[address] MD Zachary, [address], [facility], [place]       Campos Mcarthur, SARAH Francis, MONIQUE Davidson, [facility]   SARAH Javier NP        at [facility]     [address] [facility], [address], [facility].     [phone]     FAX: [phone]    at [facility]     [address], [address]     [place], [address] [place] [address] Street [address]     [phone]     FAX: [phone]       Patient Care Team:  Sherren Byars, MD as PCP - General (Family Practice)  Nichole Iniguez MD (Ophthalmology)  Macie Last MD (Vascular Surgery)        Problem List  Date Reviewed: 9/25/2018          Codes Class Noted    PBC (primary biliary cirrhosis) ICD-10-CM: K74.3  ICD-9-CM: 571.6  8/11/2014        Hypertension ICD-10-CM: I10  ICD-9-CM: 401.9  7/28/2014        Hypothyroidism ICD-10-CM: E03.9  ICD-9-CM: 244.9  7/28/2014        Osteoporosis ICD-10-CM: M81.0  ICD-9-CM: 733.00  7/28/2014        At[facility] ([facility] ICD-10-CM: I48.91  ICD-9-CM: 427.31  7/28/2014    Overview Signed 7/28/2014  4:04 PM by Rhodia Spurling, MD     Medical conversion. Remains on anti-arrythmic             Vitamin D deficiency ICD-10-CM: E55.9  ICD-9-CM: 268.9  7/28/2014        Hypercholesterolemia ICD-10-CM: E78.00  ICD-9-CM: 272.0  7/28/2014        H/O partial thyroidectomy ICD-10-CM: Z98.890  ICD-9-CM: V15.29  7/28/2014        Sicca syndrome ([facility] ICD-10-CM: M35.00  ICD-9-CM: 710.2  7/28/2014              Juan M Baltazar returns to the [address] for management of Primary Biliary Cirrhosis. The active problem list, all pertinent past medical history, medications, radiologic findings and laboratory findings related to the liver disorder were reviewed with the patient. She was first noted to have an elevation in alkaline phosphate in 6/2014. Serologic evaluation was positive for AMA.       Ultrasound of the liver was performed in 7/2014. The results of the imaging demonstrated a normal appearing liver. A liver biopsy has not been performed. The patient had vascular surgery to restore circulation to her left lower extremity in June, 2017.  placed stent in her arteries. The most recent laboratory studies indicate that the liver transaminases are normal, alkaline phosphatase is normal, tests of hepatic synthetic and metabolic function are normal, and the platelet count is normal.      Ms. Concetta Pedersen began treatment with Wynell India in August, 2014. She had a great response and her alkaline phosphatase has now normalized. She is tolerating this well without any noted side effects. The patient has no complaints which can be attributed to liver disease. The patient completes all daily activities without any functional limitations. The patient has not experienced fatigue, fevers, chills, shortness of breath, chest pain, pain in the right side over the liver, diffuse abdominal pain, nausea, vomiting, constipation, diarrhrea, dry eyes, dry mouth, arthralgias, myalgias, yellowing of the eyes or skin, itching, dark urine, problems concentrating, swelling of the abdomen, swelling of the lower extremities, hematemesis, or hematochezia. ALLERGIES  Allergies   Allergen Reactions    Sulfa (Sulfonamide Antibiotics) Itching       MEDICATIONS  Current Outpatient Medications   Medication Sig    aspirin delayed-release 81 mg tablet Take  by mouth daily.  sotalol (BETAPACE) 120 mg tablet Take 120 mg by mouth two (2) times a day.  cholecalciferol, vitamin D3, (VITAMIN D3) 2,000 unit tab Take  by mouth.  raNITIdine (ZANTAC) 150 mg tablet Take 150 mg by mouth two (2) times a day.  ursodiol (ACTIGALL) 300 mg capsule TAKE 2 CAPSULES BY MOUTH EVERY DAY    valsartan-hydrochlorothiazide (DIOVAN-HCT) 160-25 mg per tablet Take 1 Tab by mouth daily.     NIFEdipine ER (PROCARDIA XL) 60 mg ER tablet Take 60 mg by mouth daily.  levothyroxine (SYNTHROID) 25 mcg tablet Take  by mouth Daily (before breakfast).  simvastatin (ZOCOR) 80 mg tablet Take 80 mg by mouth nightly.  LORazepam (ATIVAN) 1 mg tablet Take  by mouth every four (4) hours as needed for Anxiety. No current facility-administered medications for this visit. SYSTEM REVIEW NOT RELATED TO LIVER DISEASE OR REVIEWED ABOVE:  Constitution systems: Negative for fever, chills, weight gain, weight loss. Eyes: Negative for visual changes. ENT: Negative for sore throat, painful swallowing. Respiratory: Negative for cough, hemoptysis, SOB. Cardiology: Negative for chest pain, palpitations. GI:  Negative for constipation or diarrhea. : Negative for urinary frequency, dysuria, hematuria, nocturia. Skin: Negative for rash. Hematology: Negative for easy bruising, blood clots. Musculo-skelatal: Negative for back pain, muscle pain, weakness. Neurologic: Negative for headaches, dizziness, vertigo, memory problems not related to HE. Psychology: Negative for anxiety, depression. FAMILY HISTORY:  The father  of CVA. The patient has no knowledge of the mother's medical condition. There is no family history of liver disease. SOCIAL HISTORY:  The patient is . The patient has 2 children, and 3 grandchildren and 6 great grand-children. The patient has never used tobacco products. The patient consumes an occasional glass of wine. The patient has never worked outside the home. PHYSICAL EXAMINATION:    Visit Vitals  /56 (BP 1 Location: Right arm, BP Patient Position: Sitting)   Pulse 61   Temp 97 °F (36.1 °C)   Ht 5' 2\" (1.575 m)   Wt 100 lb 9.6 oz (45.6 kg)   SpO2 90%   BMI 18.40 kg/m²       General: No acute distress. Eyes: Sclera anicteric. ENT: No oral lesions. Thyroid normal.  Nodes: No adenopathy. Skin: No spider angiomata. No jaundice. No palmar erythema. Respiratory: Lungs clear to auscultation. Cardiovascular: Regular heart rate. No murmurs. No JVD. Abdomen: Soft non-tender. Liver size normal to percussion/palpation. Spleen not palpable. No obvious ascites. Extremities: No edema. No muscle wasting. No gross arthritic changes. Neurologic: Alert and oriented. Cranial nerves grossly intact. No asterixsis.     LABORATORY STUDIES:  [address] Units 3/28/2019 9/25/2018   WBC 4.6 - 13.2 K/uL 8.2 10.6   ANC 1.8 - 8.0 K/UL 5.9 8.1 (H)   HGB 12.0 - 16.0 g/dL 13.6 12.3    - 420 K/uL 313 277   INR 0.8 - 1.2       AST 15 - 37 U/L 17 16   ALT 13 - 56 U/L 15 18   Alk Phos 45 - 117 U/L 97 88   Bili, Total 0.2 - 1.0 MG/DL 0.4 0.5   Bili, Direct 0.0 - 0.2 MG/DL 0.2 0.2   Albumin 3.4 - 5.0 g/dL 3.9 3.8   BUN 7.0 - 18 MG/DL 29 (H) 25 (H)   Creat 0.6 - 1.3 MG/DL 1.14 1.01   Na 136 - 145 mmol/L 138 138   K 3.5 - 5.5 mmol/L 4.2 4.6   Cl 100 - 108 mmol/L 104 102   CO2 21 - 32 mmol/L 30 27   Glucose 74 - 99 mg/dL 180 (H) 166 (H)     Liver [facility] Latest Ref Rng & Units 3/21/2018   WBC 4.6 - 13.2 K/uL 9.1   ANC 1.8 - 8.0 K/UL 6.9   HGB 12.0 - 16.0 g/dL 12.2    - 420 K/uL 263   INR 0.8 - 1.2      AST 15 - 37 U/L 17   ALT 13 - 56 U/L 18   Alk Phos 45 - 117 U/L 105   Bili, Total 0.2 - 1.0 MG/DL 0.6   Bili, Direct 0.0 - 0.2 MG/DL 0.2   Albumin 3.4 - 5.0 g/dL 3.9   BUN 7.0 - 18 MG/DL 24 (H)   Creat 0.6 - 1.3 MG/DL 0.98   Na 136 - 145 mmol/L 139   K 3.5 - 5.5 mmol/L 4.8   Cl 100 - 108 mmol/L 101   CO2 21 - 32 mmol/L 26   Glucose 74 - 99 mg/dL 139 (H)     SEROLOGIES:  Serologies Latest Ref Rng 7/28/2014   Ferritin 15 - 150 ng/mL 374 (H)   Iron % Saturation 15 - 55 % 21   EDILBERTO, IFA  See patterns   EDILBERTO Pattern  1:80   C-ANCA Neg:<1:20 titer <1:20   P-ANCA Neg:<1:20 titer <1:20   ANCA Neg:<1:20 titer <1:20   ASMCA 0 - 19 Units 6   M2 Ab 0.0 - 20.0 Units 142.8 (H)     LIVER HISTOLOGY:  Not available or performed    ENDOSCOPIC PROCEDURES:  Not available or performed    RADIOLOGY:  6/2014. Ultrasound of liver. Normal appearing liver. No liver mass lesions. OTHER TESTING:  Not available or performed    ASSESSMENT AND PLAN:  Primary biliary cirrhosis of unclear severity. The most recent laboratory studies indicate that the liver transaminases are normal, alkaline phosphatase is normal, tests of hepatic synthetic and metabolic function are normal, and the platelet count is normal.      She is being treated with ELIANE 600 mg QD. She is tolerating this well and her enzymes and AlkPhos have all normalized. The patient was directed to continue all current medications at the current dosages. There are no contraindications for the patient to take any medications that are necessary for treatment of other medical issues. This includes statins to treat hypercholesterolemia. The need to perform an assessment of liver fibrosis was discussed with the patient. Elastography  can assess liver fibrosis and determine if a patient has advanced fibrosis or cirrhosis without the need for liver biopsy. This can also be performed with ultrasound. This was ordered and should be performed before her next appointment. The patient was counseled regarding alcohol consumption. Sicca syndrome is an extrahepatic manifestation of PBC and will not resolve. Dr. Deidre Edouard can continue to treat the dry eyes symptomatically. She should keep up her fluid intake to deal with the dry mouth and see the dentist regularly to reduce risk of cavities which is increased by Sicca. Discussed extrahepatic manifestations of PBC such as osteoporosis, elevated cholesterol and slight increased risk of breast cancer. The risk of osteoporosis is increased in patients with PBC. DEXA bone density to assess for osteoporosis should be ordered by the patients primary care physician. The patient was advised to take calcium supplementation and Vitamin D.   She states that she has DEXA scans every 2 years.     She reports that Dr. Doc Rojas wants to start her on Prolia. She wanted to chaek with us first.  There is no contraindication to this medication from hepatology. In fact, we have quite a few patient on Prolia without any hepatic complications whatsoever. She was advised to have her mammogram completed. She was advised to take supplemental vitamin A, D, E, and K. Patients with PBC are at increased risk for hypercholesterolemia. However, this is not associated with an increased risk of cardiac disease and MI because this is typically an elevation in HDL cholesterol. There is no contraindication for treatment with a statin if this is felt to be indicated based upon cardiac risk assessment. All of the above issues were discussed with the patient. All questions were answered. The patient expressed a clear understanding of the above. [address] [address] in 6 months.       Chris Plaza NP  Liver [place] of [address] [place] [address], [address] [place] [address], [address]  [phone]

## 2019-03-28 NOTE — PROGRESS NOTES
1. Have you been to the ER, urgent care clinic since your last visit? Hospitalized since your last visit? No    2. Have you seen or consulted any other health care providers outside of the [address] [place] [address] since your last visit? Include any pap smears or colon screening. Yes .

## 2019-05-15 ENCOUNTER — HOSPITAL ENCOUNTER (OUTPATIENT)
Dept: ULTRASOUND IMAGING | Age: 84
Discharge: HOME | End: 2019-05-15
Payer: MEDICARE

## 2019-05-15 DIAGNOSIS — K74.3 PRIMARY BILIARY CHOLANGITIS (HCC): ICD-10-CM

## 2019-05-15 PROCEDURE — 76981 USE PARENCHYMA: CPT

## 2019-05-20 NOTE — PROGRESS NOTES
Please let her know that her ultrasound is unremarkable. No hepatic masses. Elastography suggests a Metavir fibrosis score of F1, normal to mild hepatic fibrosis. Thank you.

## 2019-09-25 ENCOUNTER — OFFICE VISIT (OUTPATIENT)
Dept: HEMATOLOGY | Age: 84
End: 2019-09-25

## 2019-09-25 ENCOUNTER — HOSPITAL ENCOUNTER (OUTPATIENT)
Dept: LAB | Age: 84
Discharge: HOME | End: 2019-09-25
Payer: MEDICARE

## 2019-09-25 VITALS
RESPIRATION RATE: 16 BRPM | HEIGHT: 62 IN | TEMPERATURE: 97 F | OXYGEN SATURATION: 99 % | SYSTOLIC BLOOD PRESSURE: 188 MMHG | BODY MASS INDEX: 17.92 KG/M2 | DIASTOLIC BLOOD PRESSURE: 62 MMHG | WEIGHT: 97.4 LBS | HEART RATE: 65 BPM

## 2019-09-25 DIAGNOSIS — K74.3 PRIMARY BILIARY CHOLANGITIS (HCC): ICD-10-CM

## 2019-09-25 DIAGNOSIS — K74.3 PRIMARY BILIARY CHOLANGITIS (HCC): Primary | ICD-10-CM

## 2019-09-25 LAB
ALBUMIN SERPL-MCNC: 3.9 G/DL (ref 3.4–5)
ALBUMIN/GLOB SERPL: 1.1 {RATIO} (ref 0.8–1.7)
ALP SERPL-CCNC: 93 U/L (ref 45–117)
ALT SERPL-CCNC: 18 U/L (ref 13–56)
ANION GAP SERPL CALC-SCNC: 6 MMOL/L (ref 3–18)
AST SERPL-CCNC: 17 U/L (ref 10–38)
BASOPHILS # BLD: 0.1 K/UL (ref 0–0.1)
BASOPHILS NFR BLD: 0 % (ref 0–2)
BILIRUB DIRECT SERPL-MCNC: 0.2 MG/DL (ref 0–0.2)
BILIRUB SERPL-MCNC: 0.6 MG/DL (ref 0.2–1)
BUN SERPL-MCNC: 27 MG/DL (ref 7–18)
BUN/CREAT SERPL: 28 (ref 12–20)
CALCIUM SERPL-MCNC: 9.2 MG/DL (ref 8.5–10.1)
CHLORIDE SERPL-SCNC: 105 MMOL/L (ref 100–111)
CO2 SERPL-SCNC: 27 MMOL/L (ref 21–32)
CREAT SERPL-MCNC: 0.97 MG/DL (ref 0.6–1.3)
DIFFERENTIAL METHOD BLD: ABNORMAL
EOSINOPHIL # BLD: 0.4 K/UL (ref 0–0.4)
EOSINOPHIL NFR BLD: 3 % (ref 0–5)
ERYTHROCYTE [DISTWIDTH] IN BLOOD BY AUTOMATED COUNT: 16.6 % (ref 11.6–14.5)
GLOBULIN SER CALC-MCNC: 3.5 G/DL (ref 2–4)
GLUCOSE SERPL-MCNC: 134 MG/DL (ref 74–99)
HCT VFR BLD AUTO: 41.4 % (ref 35–45)
HGB BLD-MCNC: 12.8 G/DL (ref 12–16)
LYMPHOCYTES # BLD: 1.2 K/UL (ref 0.9–3.6)
LYMPHOCYTES NFR BLD: 11 % (ref 21–52)
MCH RBC QN AUTO: 28.8 PG (ref 24–34)
MCHC RBC AUTO-ENTMCNC: 30.9 G/DL (ref 31–37)
MCV RBC AUTO: 93.2 FL (ref 74–97)
MONOCYTES # BLD: 0.8 K/UL (ref 0.05–1.2)
MONOCYTES NFR BLD: 7 % (ref 3–10)
NEUTS SEG # BLD: 8.8 K/UL (ref 1.8–8)
NEUTS SEG NFR BLD: 79 % (ref 40–73)
PLATELET # BLD AUTO: 288 K/UL (ref 135–420)
PMV BLD AUTO: 10.9 FL (ref 9.2–11.8)
POTASSIUM SERPL-SCNC: 4 MMOL/L (ref 3.5–5.5)
PROT SERPL-MCNC: 7.4 G/DL (ref 6.4–8.2)
RBC # BLD AUTO: 4.44 M/UL (ref 4.2–5.3)
SODIUM SERPL-SCNC: 138 MMOL/L (ref 136–145)
WBC # BLD AUTO: 11.2 K/UL (ref 4.6–13.2)

## 2019-09-25 PROCEDURE — 80076 HEPATIC FUNCTION PANEL: CPT

## 2019-09-25 PROCEDURE — 85025 COMPLETE CBC W/AUTO DIFF WBC: CPT

## 2019-09-25 PROCEDURE — 36415 COLL VENOUS BLD VENIPUNCTURE: CPT

## 2019-09-25 PROCEDURE — 80048 BASIC METABOLIC PNL TOTAL CA: CPT

## 2019-09-25 RX ORDER — URSODIOL 300 MG/1
CAPSULE ORAL
Qty: 180 CAP | Refills: 3 | Status: SHIPPED | OUTPATIENT
Start: 2019-09-25

## 2019-09-25 NOTE — PROGRESS NOTES
Trang Bobo is a 80 y.o. female      1. Have you been to the ER, urgent care clinic or hospitalized since your last visit? NO.     2. Have you seen or consulted any other health care providers outside of the [address] [place] [address] since your last visit (Include any pap smears or colon screening)? YES    Patient has been to pcp since last visit for routine follow up.              Learning Assessment 6/28/2016   PRIMARY LEARNER Spouse   PRIMARY LANGUAGE ENGLISH   LEARNER PREFERENCE PRIMARY READING   ANSWERED BY self   RELATIONSHIP SELF

## 2019-09-25 NOTE — PROGRESS NOTES
[address] [facility], MD, Lauri Obregon MD Perfecto Se, PA-C Roma Genta, ACNP-BC     April S Regina, [facility]   Cristóbal Gael FNP-KATHRYN    Tyreejean-paul Kimmiematt, [facility]       Gian Yarbrough [facility] 136    at [facility]    [address] [facility], [address] [place] [address], [facility] 22.    [phone]    FAX: [address] [place] [address] Avenue    [facility]    [address], [address]    [place], [address] [place] [address] Street - [address]    [phone]    FAX: [phone]           Patient Care Team:  Roddy Solomon MD as PCP - General (Family Practice)  Francis Gaucher, MD (Ophthalmology)  Avelino Pace MD (Vascular Surgery)        Problem List  Date Reviewed: 9/25/2019          Codes Class Noted    PBC (primary biliary cirrhosis) ICD-10-CM: K74.3  ICD-9-CM: 571.6  8/11/2014        Hypertension ICD-10-CM: I10  ICD-9-CM: 401.9  7/28/2014        Hypothyroidism ICD-10-CM: E03.9  ICD-9-CM: 244.9  7/28/2014        Osteoporosis ICD-10-CM: M81.0  ICD-9-CM: 733.00  7/28/2014        Atrial fibrillation ([facility] ICD-10-CM: I48.91  ICD-9-CM: 427.31  7/28/2014    Overview Signed 7/28/2014  4:04 PM by Alyssa Ruelas MD     Medical conversion. Remains on anti-arrythmic             Vitamin D deficiency ICD-10-CM: E55.9  ICD-9-CM: 268.9  7/28/2014        Hypercholesterolemia ICD-10-CM: E78.00  ICD-9-CM: 272.0  7/28/2014        H/O partial thyroidectomy ICD-10-CM: Z98.890  ICD-9-CM: V15.29  7/28/2014        Sicca syndrome ([facility] ICD-10-CM: M35.00  ICD-9-CM: 710.2  7/28/2014              Marley Edmonds returns to the [address] [address] of [facility] for management of Primary Biliary Cirrhosis.   The active problem list, all pertinent past medical history, medications, radiologic findings and laboratory findings related to the liver disorder were reviewed with the patient. She was first noted to have an elevation in alkaline phosphate in 6/2014. Serologic evaluation was positive for AMA. Ultrasound of the liver was performed in 7/2014. The results of the imaging demonstrated a normal appearing liver. A liver biopsy has not been performed. The patient had vascular surgery to restore circulation to her left lower extremity in June, 2017.  placed stent in her arteries. The most recent laboratory studies indicate that the liver transaminases are normal, alkaline phosphatase is normal, tests of hepatic synthetic and metabolic function are normal, and the platelet count is normal.      Ms. Josue Armendariz began treatment with Larraine Forward in August, 2014. She had a great response and her alkaline phosphatase has now normalized. She is tolerating this well without any noted side effects. The patient has no complaints which can be attributed to liver disease. The patient completes all daily activities without any functional limitations. The patient has not experienced fatigue, fevers, chills, shortness of breath, chest pain, pain in the right side over the liver, diffuse abdominal pain, nausea, vomiting, constipation, diarrhrea, dry eyes, dry mouth, arthralgias, myalgias, yellowing of the eyes or skin, itching, dark urine, problems concentrating, swelling of the abdomen, swelling of the lower extremities, hematemesis, or hematochezia. ALLERGIES  Allergies   Allergen Reactions    Sulfa (Sulfonamide Antibiotics) Itching       MEDICATIONS  Current Outpatient Medications   Medication Sig    ursodiol (ACTIGALL) 300 mg capsule TAKE 2 CAPSULES BY MOUTH EVERY DAY  Indications: Rare Progressive Liver Disease - Primary Biliary Cirrhosis    aspirin delayed-release 81 mg tablet Take  by mouth daily.  sotalol (BETAPACE) 120 mg tablet Take 120 mg by mouth two (2) times a day.     cholecalciferol, vitamin D3, (VITAMIN D3) 2,000 unit tab Take by mouth.  raNITIdine (ZANTAC) 150 mg tablet Take 150 mg by mouth two (2) times a day.  valsartan-hydrochlorothiazide (DIOVAN-HCT) 160-25 mg per tablet Take 1 Tab by mouth daily.  NIFEdipine ER (PROCARDIA XL) 60 mg ER tablet Take 60 mg by mouth daily.  levothyroxine (SYNTHROID) 25 mcg tablet Take  by mouth Daily (before breakfast).  simvastatin (ZOCOR) 80 mg tablet Take 80 mg by mouth nightly.  LORazepam (ATIVAN) 1 mg tablet Take  by mouth every four (4) hours as needed for Anxiety. No current facility-administered medications for this visit. SYSTEM REVIEW NOT RELATED TO LIVER DISEASE OR REVIEWED ABOVE:  Constitution systems: Negative for fever, chills, weight gain, weight loss. Eyes: Negative for visual changes. ENT: Negative for sore throat, painful swallowing. Respiratory: Negative for cough, hemoptysis, SOB. Cardiology: Negative for chest pain, palpitations. GI:  Negative for constipation or diarrhea. : Negative for urinary frequency, dysuria, hematuria, nocturia. Skin: Negative for rash. Hematology: Negative for easy bruising, blood clots. Musculo-skelatal: Negative for back pain, muscle pain, weakness. Neurologic: Negative for headaches, dizziness, vertigo, memory problems not related to HE. Psychology: Negative for anxiety, depression. FAMILY HISTORY:  The father  of CVA. The patient has no knowledge of the mother's medical condition. There is no family history of liver disease. SOCIAL HISTORY:  The patient is . The patient has 2 children, and 3 grandchildren and 6 great grand-children. The patient has never used tobacco products. The patient consumes an occasional glass of wine. The patient has never worked outside the home.     PHYSICAL EXAMINATION:    Visit Vitals  /62 (BP 1 Location: Right arm, BP Patient Position: Sitting)   Pulse 65   Temp 97 °F (36.1 °C)   Resp 16   Ht 5' 2\" (1.575 m)   Wt 97 lb 6.4 oz (44.2 kg)   SpO2 99%   BMI 17.81 kg/m²       General: No acute distress. Eyes: Sclera anicteric. ENT: No oral lesions. Thyroid normal.  Nodes: No adenopathy. Skin: No spider angiomata. No jaundice. No palmar erythema. Respiratory: Lungs clear to auscultation. Cardiovascular: Regular heart rate. No murmurs. No JVD. Abdomen: Soft non-tender. Liver size normal to percussion/palpation. Spleen not palpable. No obvious ascites. Extremities: No edema. No muscle wasting. No gross arthritic changes. Neurologic: Alert and oriented. Cranial nerves grossly intact. No asterixsis. LABORATORY STUDIES:  Liver [place] of [address] Units 9/25/2019 3/28/2019   WBC 4.6 - 13.2 K/uL 11.2 8.2   ANC 1.8 - 8.0 K/UL 8.8 (H) 5.9   HGB 12.0 - 16.0 g/dL 12.8 13.6    - 420 K/uL 288 313   INR 0.8 - 1.2       AST 10 - 38 U/L 17 17   ALT 13 - 56 U/L 18 15   Alk Phos 45 - 117 U/L 93 97   Bili, Total 0.2 - 1.0 MG/DL 0.6 0.4   Bili, Direct 0.0 - 0.2 MG/DL 0.2 0.2   Albumin 3.4 - 5.0 g/dL 3.9 3.9   BUN 7.0 - 18 MG/DL 27 (H) 29 (H)   Creat 0.6 - 1.3 MG/DL 0.97 1.14   Na 136 - 145 mmol/L 138 138   K 3.5 - 5.5 mmol/L 4.0 4.2   Cl 100 - 111 mmol/L 105 104   CO2 21 - 32 mmol/L 27 30   Glucose 74 - 99 mg/dL 134 (H) 180 (H)       SEROLOGIES:  Serologies Latest Ref Rng 7/28/2014   Ferritin 15 - 150 ng/mL 374 (H)   Iron % Saturation 15 - 55 % 21   EDILBERTO, IFA  See patterns   EDILBERTO Pattern  1:80   C-ANCA Neg:<1:20 titer <1:20   P-ANCA Neg:<1:20 titer <1:20   ANCA Neg:<1:20 titer <1:20   ASMCA 0 - 19 Units 6   M2 Ab 0.0 - 20.0 Units 142.8 (H)     LIVER HISTOLOGY:  05/2019. Shear wave elastography. Elastography mean value is 7.99 kPa with standard deviation of 0.58 kPa. ENDOSCOPIC PROCEDURES:  Not available or performed    RADIOLOGY:  6/2014. Ultrasound of liver. Normal appearing liver. No liver mass lesions. 05/2019. Ultrasound of the liver. Coarsened increased hepatic echogenicity. No focal abnormality.     OTHER TESTING:  Not available or performed    ASSESSMENT AND PLAN:  Primary biliary cirrhosis of unclear severity. The most recent laboratory studies indicate that the liver transaminases are normal, alkaline phosphatase is normal, tests of hepatic synthetic and metabolic function are normal, and the platelet count is normal.      She is being treated with ELIANE 600 mg QD. She is tolerating this well and her enzymes and AlkPhos have all normalized. The patient was directed to continue all current medications at the current dosages. There are no contraindications for the patient to take any medications that are necessary for treatment of other medical issues. This includes statins to treat hypercholesterolemia. The patient was counseled regarding alcohol consumption. Sicca syndrome is an extrahepatic manifestation of PBC and will not resolve. Dr. Jesus Carbone can continue to treat the dry eyes symptomatically. She should keep up her fluid intake to deal with the dry mouth and see the dentist regularly to reduce risk of cavities which is increased by Sicca. Discussed extrahepatic manifestations of PBC such as osteoporosis, elevated cholesterol and slight increased risk of breast cancer. The risk of osteoporosis is increased in patients with PBC. DEXA bone density to assess for osteoporosis should be ordered by the patients primary care physician. The patient was advised to take calcium supplementation and Vitamin D. She states that she has DEXA scans every 2 years. She reports that Dr. Ronnie Jimenez wants to start her on Prolia. She wanted to chaek with us first.  There is no contraindication to this medication from hepatology. In fact, we have quite a few patient on Prolia without any hepatic complications whatsoever. She was advised to have her mammogram completed. She was advised to take supplemental vitamin A, D, E, and K. Patients with PBC are at increased risk for hypercholesterolemia.   However, this is not associated with an increased risk of cardiac disease and MI because this is typically an elevation in HDL cholesterol. There is no contraindication for treatment with a statin if this is felt to be indicated based upon cardiac risk assessment. All of the above issues were discussed with the patient. All questions were answered. The patient expressed a clear understanding of the above. [address] [address] in 6 months.       Sakina Lewis NP  Liver [place] of [address] [place] [address], [address] [place] [address] Michaelle Hughes, [address] [place] [address] Street [address]  [phone]